# Patient Record
Sex: MALE | Race: WHITE | NOT HISPANIC OR LATINO | Employment: FULL TIME | URBAN - METROPOLITAN AREA
[De-identification: names, ages, dates, MRNs, and addresses within clinical notes are randomized per-mention and may not be internally consistent; named-entity substitution may affect disease eponyms.]

---

## 2017-08-17 ENCOUNTER — OFFICE VISIT (OUTPATIENT)
Dept: SURGERY | Facility: CLINIC | Age: 52
End: 2017-08-17
Payer: COMMERCIAL

## 2017-08-17 VITALS
BODY MASS INDEX: 24.38 KG/M2 | SYSTOLIC BLOOD PRESSURE: 100 MMHG | HEART RATE: 77 BPM | WEIGHT: 180 LBS | HEIGHT: 72 IN | DIASTOLIC BLOOD PRESSURE: 62 MMHG

## 2017-08-17 DIAGNOSIS — K42.9 UMBILICAL HERNIA WITHOUT OBSTRUCTION AND WITHOUT GANGRENE: ICD-10-CM

## 2017-08-17 DIAGNOSIS — K40.90 LEFT INGUINAL HERNIA: Primary | ICD-10-CM

## 2017-08-17 PROCEDURE — 99244 OFF/OP CNSLTJ NEW/EST MOD 40: CPT | Performed by: SURGERY

## 2017-08-17 RX ORDER — DIVALPROEX SODIUM 250 MG/1
250 TABLET, DELAYED RELEASE ORAL 2 TIMES DAILY
COMMUNITY
End: 2022-06-09

## 2017-08-17 NOTE — MR AVS SNAPSHOT
"              After Visit Summary   2017    Scott Ocampo    MRN: [id number]           Patient Information     Date Of Birth          1965        Visit Information        Provider Department      2017 9:15 AM Blake Whitley MD [facility]       Follow-ups after your visit        Who to contact     If you have questions or need follow up information about today's clinic visit or your schedule please contact [facility] directly at [phone].  Normal or non-critical lab and imaging results will be communicated to you by [employer], letter or phone within 4 business days after the clinic has received the results. If you do not hear from us within 7 days, please contact the clinic through [employer] or phone. If you have a critical or abnormal lab result, we will notify you by phone as soon as possible.  Submit refill requests through [employer] or call your pharmacy and they will forward the refill request to us. Please allow 3 business days for your refill to be completed.          Additional Information About Your Visit        MyChart Information     [employer] lets you send messages to your doctor, view your test results, renew your prescriptions, schedule appointments and more. To sign up, go to [email or web address] . Click on \"Log in\" on the left side of the screen, which will take you to the Welcome page. Then click on \"Sign up Now\" on the right side of the page.     You will be asked to enter the access code listed below, as well as some personal information. Please follow the directions to create your username and password.     Your access code is: [email or web address]  Expires: 11/15/2017  9:57 AM     Your access code will  in 90 days. If you need help or a new code, please call your [facility] clinic or [phone].        Care EveryWhere ID     This is your Care EveryWhere ID. This could be used by other " organizations to access your [place] medical records  [id number]        Your Vitals Were     Pulse Height BMI (Body Mass Index)             77 6' (1.829 m) 24.41 kg/m2          Blood Pressure from Last 3 Encounters:   08/17/17 100/62    Weight from Last 3 Encounters:   08/17/17 180 lb (81.6 kg)              Today, you had the following     No orders found for display       Primary Care Provider    Physician No Ref-Primary       No address on file        Equal Access to Services     POLLO VELÁZQUEZ : Hadii aad ku hadasho Soomaali, waaxda luqadaha, qaybta kaalmada adeegyada, waxay idiin hayolen adeeg shanika liset . So [facility] [phone].    ATENCIÓN: Si habla español, tiene a turner disposición servicios gratuitos de asistencia lingüística. Llame al [phone].    We comply with applicable federal civil rights laws and [place] laws. We do not discriminate on the basis of race, color, national origin, age, disability sex, sexual orientation or gender identity.            Thank you!     Thank you for choosing [facility]  for your care. Our goal is always to provide you with excellent care. Hearing back from our patients is one way we can continue to improve our services. Please take a few minutes to complete the written survey that you may receive in the mail after your visit with us. Thank you!             Your Updated Medication List - Protect others around you: Learn how to safely use, store and throw away your medicines at www.disposemymeds.org.          This list is accurate as of: 8/17/17  9:57 AM.  Always use your most recent med list.                   Brand Name Dispense Instructions for use Diagnosis    divalproex 250 MG EC tablet    DEPAKOTE     Take 250 mg by mouth 2 times daily        KEPPRA PO      Take 1,500 mg by mouth 2 times daily

## 2017-08-17 NOTE — LETTER
2017    [facility]  Surgery Consultation    RE:  Scott Meier Terrence-:  [birth year]     CONSULTATION REQUESTED BY:  Juventino Mccormick MD     HPI: Patient is a pleasant 52-year-old gentleman who presents for evaluation of left-sided groin pain.  He is referred by the above-mentioned provider to evaluate for possible sports hernia.  He reports that his difficulty began over a year ago when he sustained a groin injury while sprinting.  Ever since then he's had lower abdominal discomfort along his pubic bone with running in particular.  He has taken some time off withget resolution in symptoms.  He has been performing physical therapy with no smoking improvement.  He notices discomfort with situps or crunches.  He has no radiating discomfort into his testicle.  He has a prior history of bilateral hip surgery.     PMH:   has no past medical history on file.  PSH:    has a past surgical history that includes brain surgery (2016).  Social History:   reports that he has never smoked. He has never used smokeless tobacco. He reports that he drinks alcohol. He reports that he does not use illicit drugs.  Family History:  family history includes Anesthesia Reaction in his brother; CEREBROVASCULAR DISEASE in his paternal grandmother; Coronary Artery Disease in his father; Hyperlipidemia in his father; Hypertension in his mother; Other Cancer in his father; Prostate Cancer in his father; Substance Abuse in his maternal grandmother.  Medications/Allergies: Home medications and allergies reviewed.     ROS:  The 10 point Review of Systems is negative other than noted in the HPI.     Physical Exam:  /62  Pulse 77  Ht 6' (1.829 m)  Wt 180 lb (81.6 kg)  BMI 24.41 kg/m2  GENERAL: Generally appears well.  Psych: Alert and Oriented.  Normal affect  Eyes: Sclera clear  Respiratory:  Lungs clear to ausculation bilaterally with good air excursion  Cardiovascular:  Regular Rate and Rhythm with no  murmurs gallops or rubs, normal peripheral pulses  GI: Abdomen Non Distended Non-Tender  Umbilical hernia palpated..  Groin- I examined the patient in both the standing and supine positions. Right Groin- No hernia Palpated. Left Groin- Small inguinal hernia. No scrotal or testicle abnormalities.  Lymphatic/Hematologic/Immune:  No femoral or cervical lymphadenopathy.  Integumentary:  No rashes  Neurological: grossly intact      All new lab and imaging data was reviewed.      Impression and Plan:  Patient is a 52 year old male with symptomatic left inguinal hernia and umbilical hernia     PLAN: Outpatient laparoscopic inguinal hernia repair with open umbilical hernia repair at his convenience.  I discussed the pathophysiology of hernias and options for repair including laparoscopic VS open.  The risks associated with the procedure including, but not limited to, recurrence, nerve entrapment or injury, persistence of pain, injury to the bowel/bladder, infertility, hematoma, mesh migration, mesh infection, MI, and PE were discussed with the patient. He indicated understanding of the discussion, asked appropriate questions, and provided consent. Signs and symptoms of incarceration were discussed. If these develop in the interim, he promises to call or go straight to the ER. I have provided the patient with an information pamphlet.  Thank you very much for this consult.     Blake Whitley M.D.  [facility]  [phone]

## 2017-08-20 NOTE — PROGRESS NOTES
[facility]  Surgery Consultation    CONSULTATION REQUESTED BY:  Juventino Mccormick MD    HPI: Patient is a pleasant 52-year-old gentleman who presents for evaluation of left-sided groin pain.  He is referred by the above-mentioned provider to evaluate for possible sports hernia.  He reports that his difficulty began over a year ago when he sustained a groin injury while sprinting.  Ever since then he's had lower abdominal discomfort along his pubic bone with running in particular.  He has taken some time off withget resolution in symptoms.  He has been performing physical therapy with no smoking improvement.  He notices discomfort with situps or crunches.  He has no radiating discomfort into his testicle.  He has a prior history of bilateral hip surgery.    PMH:   has no past medical history on file.  PSH:    has a past surgical history that includes brain surgery (07/2016).  Social History:   reports that he has never smoked. He has never used smokeless tobacco. He reports that he drinks alcohol. He reports that he does not use illicit drugs.  Family History:  family history includes Anesthesia Reaction in his brother; CEREBROVASCULAR DISEASE in his paternal grandmother; Coronary Artery Disease in his father; Hyperlipidemia in his father; Hypertension in his mother; Other Cancer in his father; Prostate Cancer in his father; Substance Abuse in his maternal grandmother.  Medications/Allergies: Home medications and allergies reviewed.    ROS:  The 10 point Review of Systems is negative other than noted in the HPI.    Physical Exam:  /62  Pulse 77  Ht 6' (1.829 m)  Wt 180 lb (81.6 kg)  BMI 24.41 kg/m2  GENERAL: Generally appears well.  Psych: Alert and Oriented.  Normal affect  Eyes: Sclera clear  Respiratory:  Lungs clear to ausculation bilaterally with good air excursion  Cardiovascular:  Regular Rate and Rhythm with no murmurs gallops or rubs, normal peripheral pulses  GI: Abdomen Non Distended  Non-Tender  Umbilical hernia palpated..  Groin- I examined the patient in both the standing and supine positions. Right Groin- No hernia Palpated. Left Groin- Small inguinal hernia. No scrotal or testicle abnormalities.  Lymphatic/Hematologic/Immune:  No femoral or cervical lymphadenopathy.  Integumentary:  No rashes  Neurological: grossly intact     All new lab and imaging data was reviewed.     Impression and Plan:  Patient is a 52 year old male with symptomatic left inguinal hernia and umbilical hernia    PLAN: Outpatient laparoscopic inguinal hernia repair with open umbilical hernia repair at his convenience.  I discussed the pathophysiology of hernias and options for repair including laparoscopic VS open.  The risks associated with the procedure including, but not limited to, recurrence, nerve entrapment or injury, persistence of pain, injury to the bowel/bladder, infertility, hematoma, mesh migration, mesh infection, MI, and PE were discussed with the patient. He indicated understanding of the discussion, asked appropriate questions, and provided consent. Signs and symptoms of incarceration were discussed. If these develop in the interim, he promises to call or go straight to the ER. I have provided the patient with an information pamphlet.  Thank you very much for this consult.    Blake Whitley M.D.  [facility]  [phone]    Please route or send letter to:  Primary Care Provider (PCP) and Referring Provider

## 2018-04-30 ENCOUNTER — TELEPHONE (OUTPATIENT)
Dept: SURGERY | Facility: CLINIC | Age: 53
End: 2018-04-30

## 2018-04-30 NOTE — TELEPHONE ENCOUNTER
Type of surgery: Laparoscopic left inguinal hernia, possibly bilateral repair and open umbilical hernia repair  Location of surgery: [facility]  Date and time of surgery: 5/15/18 at 10:30am  Surgeon: Dr. Blake Whitley  Pre-Op Appt Date: Patient to schedule    Post-Op Appt Date: Patient to schedule   Packet sent out: Yes  Pre-cert/Authorization completed:  Not Applicable  Date: 4/30/18

## 2018-05-08 ENCOUNTER — TRANSFERRED RECORDS (OUTPATIENT)
Dept: HEALTH INFORMATION MANAGEMENT | Facility: CLINIC | Age: 53
End: 2018-05-08

## 2018-05-08 LAB
CREAT SERPL-MCNC: 0.95 MG/DL (ref 0.76–1.27)
GFR SERPL CREATININE-BSD FRML MDRD: 91 ML/MIN/1.73M2
GLUCOSE SERPL-MCNC: 88 MG/DL (ref 65–99)
INR PPP: 1 (ref 0.8–1.2)
POTASSIUM SERPL-SCNC: 4.8 MMOL/L (ref 3.5–5.2)

## 2018-05-14 RX ORDER — LORATADINE 10 MG/1
10 TABLET ORAL DAILY
COMMUNITY
End: 2022-06-09

## 2018-05-14 RX ORDER — MULTIPLE VITAMINS W/ MINERALS TAB 9MG-400MCG
1 TAB ORAL
COMMUNITY
End: 2022-07-22

## 2018-05-15 ENCOUNTER — SURGERY (OUTPATIENT)
Age: 53
End: 2018-05-15

## 2018-05-15 ENCOUNTER — ANESTHESIA (OUTPATIENT)
Dept: SURGERY | Facility: CLINIC | Age: 53
End: 2018-05-15
Payer: COMMERCIAL

## 2018-05-15 ENCOUNTER — OFFICE VISIT (OUTPATIENT)
Dept: SURGERY | Facility: PHYSICIAN GROUP | Age: 53
End: 2018-05-15
Payer: COMMERCIAL

## 2018-05-15 ENCOUNTER — HOSPITAL ENCOUNTER (OUTPATIENT)
Facility: CLINIC | Age: 53
Discharge: HOME | End: 2018-05-15
Attending: SURGERY | Admitting: SURGERY
Payer: COMMERCIAL

## 2018-05-15 ENCOUNTER — ANESTHESIA EVENT (OUTPATIENT)
Dept: SURGERY | Facility: CLINIC | Age: 53
End: 2018-05-15
Payer: COMMERCIAL

## 2018-05-15 VITALS
TEMPERATURE: 97.7 F | DIASTOLIC BLOOD PRESSURE: 71 MMHG | RESPIRATION RATE: 16 BRPM | HEIGHT: 72 IN | BODY MASS INDEX: 24.46 KG/M2 | SYSTOLIC BLOOD PRESSURE: 112 MMHG | WEIGHT: 180.6 LBS | OXYGEN SATURATION: 95 %

## 2018-05-15 DIAGNOSIS — K42.9 UMBILICAL HERNIA WITHOUT OBSTRUCTION AND WITHOUT GANGRENE: ICD-10-CM

## 2018-05-15 DIAGNOSIS — K40.20 NON-RECURRENT BILATERAL INGUINAL HERNIA WITHOUT OBSTRUCTION OR GANGRENE: Primary | ICD-10-CM

## 2018-05-15 PROCEDURE — 25000128 H RX IP 250 OP 636: Performed by: NURSE ANESTHETIST, CERTIFIED REGISTERED

## 2018-05-15 PROCEDURE — 27210794 ZZH OR GENERAL SUPPLY STERILE: Performed by: SURGERY

## 2018-05-15 PROCEDURE — 71000013 ZZH RECOVERY PHASE 1 LEVEL 1 EA ADDTL HR: Performed by: SURGERY

## 2018-05-15 PROCEDURE — 71000027 ZZH RECOVERY PHASE 2 EACH 15 MINS: Performed by: SURGERY

## 2018-05-15 PROCEDURE — 25000132 ZZH RX MED GY IP 250 OP 250 PS 637: Performed by: SURGERY

## 2018-05-15 PROCEDURE — 40000170 ZZH STATISTIC PRE-PROCEDURE ASSESSMENT II: Performed by: SURGERY

## 2018-05-15 PROCEDURE — 36000056 ZZH SURGERY LEVEL 3 1ST 30 MIN: Performed by: SURGERY

## 2018-05-15 PROCEDURE — 27210995 ZZH RX 272: Performed by: SURGERY

## 2018-05-15 PROCEDURE — C1781 MESH (IMPLANTABLE): HCPCS | Performed by: SURGERY

## 2018-05-15 PROCEDURE — 49585 ZZHC REPAIR UMBILICAL HERN,5+Y/O,REDUC: CPT | Mod: 51 | Performed by: SURGERY

## 2018-05-15 PROCEDURE — 37000009 ZZH ANESTHESIA TECHNICAL FEE, EACH ADDTL 15 MIN: Performed by: SURGERY

## 2018-05-15 PROCEDURE — 25000128 H RX IP 250 OP 636: Performed by: SURGERY

## 2018-05-15 PROCEDURE — 25000125 ZZHC RX 250: Performed by: NURSE ANESTHETIST, CERTIFIED REGISTERED

## 2018-05-15 PROCEDURE — 71000012 ZZH RECOVERY PHASE 1 LEVEL 1 FIRST HR: Performed by: SURGERY

## 2018-05-15 PROCEDURE — 25000566 ZZH SEVOFLURANE, EA 15 MIN: Performed by: SURGERY

## 2018-05-15 PROCEDURE — 36000058 ZZH SURGERY LEVEL 3 EA 15 ADDTL MIN: Performed by: SURGERY

## 2018-05-15 PROCEDURE — 49650 LAP ING HERNIA REPAIR INIT: CPT | Mod: 50 | Performed by: SURGERY

## 2018-05-15 PROCEDURE — 25000125 ZZHC RX 250: Performed by: SURGERY

## 2018-05-15 PROCEDURE — 37000008 ZZH ANESTHESIA TECHNICAL FEE, 1ST 30 MIN: Performed by: SURGERY

## 2018-05-15 PROCEDURE — 25000128 H RX IP 250 OP 636: Performed by: ANESTHESIOLOGY

## 2018-05-15 DEVICE — MESH PROGRIP LAPAROSCOPIC 5.9X3.9" PARIETEX SELF-FIX LPG1510: Type: IMPLANTABLE DEVICE | Site: GROIN | Status: FUNCTIONAL

## 2018-05-15 DEVICE — MESH COMPOSITE W/COLLAGEN 4CM OPEN VENTRAL PARIETEX PCO4VP: Type: IMPLANTABLE DEVICE | Site: UMBILICAL | Status: FUNCTIONAL

## 2018-05-15 RX ORDER — DEXAMETHASONE SODIUM PHOSPHATE 4 MG/ML
INJECTION, SOLUTION INTRA-ARTICULAR; INTRALESIONAL; INTRAMUSCULAR; INTRAVENOUS; SOFT TISSUE PRN
Status: DISCONTINUED | OUTPATIENT
Start: 2018-05-15 | End: 2018-05-15

## 2018-05-15 RX ORDER — MAGNESIUM HYDROXIDE 1200 MG/15ML
LIQUID ORAL PRN
Status: DISCONTINUED | OUTPATIENT
Start: 2018-05-15 | End: 2018-05-15 | Stop reason: HOSPADM

## 2018-05-15 RX ORDER — NALOXONE HYDROCHLORIDE 0.4 MG/ML
.1-.4 INJECTION, SOLUTION INTRAMUSCULAR; INTRAVENOUS; SUBCUTANEOUS
Status: DISCONTINUED | OUTPATIENT
Start: 2018-05-15 | End: 2018-05-15 | Stop reason: HOSPADM

## 2018-05-15 RX ORDER — ONDANSETRON 4 MG/1
4 TABLET, ORALLY DISINTEGRATING ORAL EVERY 30 MIN PRN
Status: DISCONTINUED | OUTPATIENT
Start: 2018-05-15 | End: 2018-05-15 | Stop reason: HOSPADM

## 2018-05-15 RX ORDER — FENTANYL CITRATE 50 UG/ML
25-50 INJECTION, SOLUTION INTRAMUSCULAR; INTRAVENOUS
Status: DISCONTINUED | OUTPATIENT
Start: 2018-05-15 | End: 2018-05-15 | Stop reason: HOSPADM

## 2018-05-15 RX ORDER — MEPERIDINE HYDROCHLORIDE 25 MG/ML
12.5 INJECTION INTRAMUSCULAR; INTRAVENOUS; SUBCUTANEOUS
Status: DISCONTINUED | OUTPATIENT
Start: 2018-05-15 | End: 2018-05-15 | Stop reason: HOSPADM

## 2018-05-15 RX ORDER — GLYCOPYRROLATE 0.2 MG/ML
INJECTION, SOLUTION INTRAMUSCULAR; INTRAVENOUS PRN
Status: DISCONTINUED | OUTPATIENT
Start: 2018-05-15 | End: 2018-05-15

## 2018-05-15 RX ORDER — OXYCODONE HYDROCHLORIDE 5 MG/1
5 TABLET ORAL
Status: COMPLETED | OUTPATIENT
Start: 2018-05-15 | End: 2018-05-15

## 2018-05-15 RX ORDER — PROPOFOL 10 MG/ML
INJECTION, EMULSION INTRAVENOUS CONTINUOUS PRN
Status: DISCONTINUED | OUTPATIENT
Start: 2018-05-15 | End: 2018-05-15

## 2018-05-15 RX ORDER — EPHEDRINE SULFATE 50 MG/ML
INJECTION, SOLUTION INTRAMUSCULAR; INTRAVENOUS; SUBCUTANEOUS PRN
Status: DISCONTINUED | OUTPATIENT
Start: 2018-05-15 | End: 2018-05-15

## 2018-05-15 RX ORDER — PHYSOSTIGMINE SALICYLATE 1 MG/ML
1.2 INJECTION INTRAVENOUS
Status: DISCONTINUED | OUTPATIENT
Start: 2018-05-15 | End: 2018-05-15 | Stop reason: HOSPADM

## 2018-05-15 RX ORDER — ALBUTEROL SULFATE 90 UG/1
2 AEROSOL, METERED RESPIRATORY (INHALATION) EVERY 6 HOURS
COMMUNITY
End: 2022-06-09

## 2018-05-15 RX ORDER — BUPIVACAINE HYDROCHLORIDE AND EPINEPHRINE 5; 5 MG/ML; UG/ML
INJECTION, SOLUTION EPIDURAL; INTRACAUDAL; PERINEURAL PRN
Status: DISCONTINUED | OUTPATIENT
Start: 2018-05-15 | End: 2018-05-15 | Stop reason: HOSPADM

## 2018-05-15 RX ORDER — HYDROMORPHONE HYDROCHLORIDE 1 MG/ML
.3-.5 INJECTION, SOLUTION INTRAMUSCULAR; INTRAVENOUS; SUBCUTANEOUS EVERY 10 MIN PRN
Status: DISCONTINUED | OUTPATIENT
Start: 2018-05-15 | End: 2018-05-15 | Stop reason: HOSPADM

## 2018-05-15 RX ORDER — FENTANYL CITRATE 50 UG/ML
INJECTION, SOLUTION INTRAMUSCULAR; INTRAVENOUS PRN
Status: DISCONTINUED | OUTPATIENT
Start: 2018-05-15 | End: 2018-05-15

## 2018-05-15 RX ORDER — SODIUM CHLORIDE, SODIUM LACTATE, POTASSIUM CHLORIDE, CALCIUM CHLORIDE 600; 310; 30; 20 MG/100ML; MG/100ML; MG/100ML; MG/100ML
INJECTION, SOLUTION INTRAVENOUS CONTINUOUS PRN
Status: DISCONTINUED | OUTPATIENT
Start: 2018-05-15 | End: 2018-05-15

## 2018-05-15 RX ORDER — ACETAMINOPHEN 325 MG/1
650 TABLET ORAL
Status: DISCONTINUED | OUTPATIENT
Start: 2018-05-15 | End: 2018-05-15 | Stop reason: HOSPADM

## 2018-05-15 RX ORDER — SODIUM CHLORIDE, SODIUM LACTATE, POTASSIUM CHLORIDE, CALCIUM CHLORIDE 600; 310; 30; 20 MG/100ML; MG/100ML; MG/100ML; MG/100ML
INJECTION, SOLUTION INTRAVENOUS CONTINUOUS
Status: DISCONTINUED | OUTPATIENT
Start: 2018-05-15 | End: 2018-05-15 | Stop reason: HOSPADM

## 2018-05-15 RX ORDER — EPINEPHRINE 1 MG/ML
INJECTION, SOLUTION INTRAMUSCULAR; SUBCUTANEOUS
Status: DISCONTINUED
Start: 2018-05-15 | End: 2018-05-15 | Stop reason: HOSPADM

## 2018-05-15 RX ORDER — PROPOFOL 10 MG/ML
INJECTION, EMULSION INTRAVENOUS PRN
Status: DISCONTINUED | OUTPATIENT
Start: 2018-05-15 | End: 2018-05-15

## 2018-05-15 RX ORDER — ONDANSETRON 2 MG/ML
INJECTION INTRAMUSCULAR; INTRAVENOUS PRN
Status: DISCONTINUED | OUTPATIENT
Start: 2018-05-15 | End: 2018-05-15

## 2018-05-15 RX ORDER — LIDOCAINE HYDROCHLORIDE 20 MG/ML
INJECTION, SOLUTION INFILTRATION; PERINEURAL PRN
Status: DISCONTINUED | OUTPATIENT
Start: 2018-05-15 | End: 2018-05-15

## 2018-05-15 RX ORDER — CEFAZOLIN SODIUM 1 G/3ML
1 INJECTION, POWDER, FOR SOLUTION INTRAMUSCULAR; INTRAVENOUS SEE ADMIN INSTRUCTIONS
Status: DISCONTINUED | OUTPATIENT
Start: 2018-05-15 | End: 2018-05-15 | Stop reason: HOSPADM

## 2018-05-15 RX ORDER — KETOROLAC TROMETHAMINE 30 MG/ML
30 INJECTION, SOLUTION INTRAMUSCULAR; INTRAVENOUS ONCE
Status: COMPLETED | OUTPATIENT
Start: 2018-05-15 | End: 2018-05-15

## 2018-05-15 RX ORDER — BUPIVACAINE HYDROCHLORIDE 5 MG/ML
INJECTION, SOLUTION EPIDURAL; INTRACAUDAL
Status: DISCONTINUED
Start: 2018-05-15 | End: 2018-05-15 | Stop reason: HOSPADM

## 2018-05-15 RX ORDER — NEOSTIGMINE METHYLSULFATE 1 MG/ML
VIAL (ML) INJECTION PRN
Status: DISCONTINUED | OUTPATIENT
Start: 2018-05-15 | End: 2018-05-15

## 2018-05-15 RX ORDER — ONDANSETRON 2 MG/ML
4 INJECTION INTRAMUSCULAR; INTRAVENOUS EVERY 30 MIN PRN
Status: DISCONTINUED | OUTPATIENT
Start: 2018-05-15 | End: 2018-05-15 | Stop reason: HOSPADM

## 2018-05-15 RX ORDER — CEFAZOLIN SODIUM 2 G/100ML
2 INJECTION, SOLUTION INTRAVENOUS
Status: COMPLETED | OUTPATIENT
Start: 2018-05-15 | End: 2018-05-15

## 2018-05-15 RX ORDER — OXYCODONE HYDROCHLORIDE 5 MG/1
5-10 TABLET ORAL
Qty: 30 TABLET | Refills: 0 | Status: SHIPPED | OUTPATIENT
Start: 2018-05-15 | End: 2022-06-09

## 2018-05-15 RX ORDER — FENTANYL CITRATE 50 UG/ML
25-50 INJECTION, SOLUTION INTRAMUSCULAR; INTRAVENOUS EVERY 5 MIN PRN
Status: DISCONTINUED | OUTPATIENT
Start: 2018-05-15 | End: 2018-05-15 | Stop reason: HOSPADM

## 2018-05-15 RX ADMIN — ROCURONIUM BROMIDE 10 MG: 10 INJECTION INTRAVENOUS at 10:54

## 2018-05-15 RX ADMIN — FENTANYL CITRATE 50 MCG: 50 INJECTION, SOLUTION INTRAMUSCULAR; INTRAVENOUS at 10:54

## 2018-05-15 RX ADMIN — CEFAZOLIN SODIUM 2 G: 2 INJECTION, SOLUTION INTRAVENOUS at 10:44

## 2018-05-15 RX ADMIN — PROPOFOL 200 MCG/KG/MIN: 10 INJECTION, EMULSION INTRAVENOUS at 10:37

## 2018-05-15 RX ADMIN — Medication 5 MG: at 10:45

## 2018-05-15 RX ADMIN — NEOSTIGMINE METHYLSULFATE 3 MG: 1 INJECTION, SOLUTION INTRAVENOUS at 11:40

## 2018-05-15 RX ADMIN — ROCURONIUM BROMIDE 30 MG: 10 INJECTION INTRAVENOUS at 10:37

## 2018-05-15 RX ADMIN — ROCURONIUM BROMIDE 10 MG: 10 INJECTION INTRAVENOUS at 10:48

## 2018-05-15 RX ADMIN — FENTANYL CITRATE 50 MCG: 50 INJECTION, SOLUTION INTRAMUSCULAR; INTRAVENOUS at 11:11

## 2018-05-15 RX ADMIN — FENTANYL CITRATE 50 MCG: 50 INJECTION, SOLUTION INTRAMUSCULAR; INTRAVENOUS at 10:37

## 2018-05-15 RX ADMIN — GLYCOPYRROLATE 0.4 MG: 0.2 INJECTION, SOLUTION INTRAMUSCULAR; INTRAVENOUS at 11:40

## 2018-05-15 RX ADMIN — DEXAMETHASONE SODIUM PHOSPHATE 4 MG: 4 INJECTION, SOLUTION INTRA-ARTICULAR; INTRALESIONAL; INTRAMUSCULAR; INTRAVENOUS; SOFT TISSUE at 10:44

## 2018-05-15 RX ADMIN — SODIUM CHLORIDE, POTASSIUM CHLORIDE, SODIUM LACTATE AND CALCIUM CHLORIDE: 600; 310; 30; 20 INJECTION, SOLUTION INTRAVENOUS at 10:35

## 2018-05-15 RX ADMIN — OXYCODONE HYDROCHLORIDE 5 MG: 5 TABLET ORAL at 12:52

## 2018-05-15 RX ADMIN — ONDANSETRON 4 MG: 2 INJECTION INTRAMUSCULAR; INTRAVENOUS at 10:45

## 2018-05-15 RX ADMIN — FENTANYL CITRATE 25 MCG: 50 INJECTION, SOLUTION INTRAMUSCULAR; INTRAVENOUS at 13:18

## 2018-05-15 RX ADMIN — PROPOFOL 200 MG: 10 INJECTION, EMULSION INTRAVENOUS at 10:37

## 2018-05-15 RX ADMIN — LIDOCAINE HYDROCHLORIDE 80 MG: 20 INJECTION, SOLUTION INFILTRATION; PERINEURAL at 10:37

## 2018-05-15 RX ADMIN — KETOROLAC TROMETHAMINE 30 MG: 30 INJECTION, SOLUTION INTRAMUSCULAR at 13:01

## 2018-05-15 RX ADMIN — SODIUM CHLORIDE 1000 ML: 900 IRRIGANT IRRIGATION at 11:02

## 2018-05-15 RX ADMIN — Medication 5 MG: at 10:52

## 2018-05-15 RX ADMIN — FENTANYL CITRATE 50 MCG: 50 INJECTION, SOLUTION INTRAMUSCULAR; INTRAVENOUS at 11:54

## 2018-05-15 RX ADMIN — BUPIVACAINE HYDROCHLORIDE AND EPINEPHRINE 20 ML: 5; 5 INJECTION, SOLUTION EPIDURAL; INTRACAUDAL; PERINEURAL at 11:34

## 2018-05-15 RX ADMIN — MIDAZOLAM 2 MG: 1 INJECTION INTRAMUSCULAR; INTRAVENOUS at 10:37

## 2018-05-15 ASSESSMENT — ENCOUNTER SYMPTOMS: SEIZURES: 1

## 2018-05-15 NOTE — ANESTHESIA CARE TRANSFER NOTE
Patient: Scott Ocampo    Procedure(s):  LAPAROSCOPIC  BILATERAL  INGUINAL HERNIA REPAIR WITH MESH, OPEN UMBILICAL HERNIA REPAIR WITH MESH - Wound Class: I-Clean   - Wound Class: I-Clean    Diagnosis: UMBILICAL HERNIA, LEFT INGUINAL HERNIA  Diagnosis Additional Information: No value filed.    Anesthesia Type:   General, ETT     Note:  Airway :Face Mask  Patient transferred to:PACU  Handoff Report: Identifed the Patient, Identified the Reponsible Provider, Reviewed the pertinent medical history, Discussed the surgical course, Reviewed Intra-OP anesthesia mangement and issues during anesthesia, Set expectations for post-procedure period and Allowed opportunity for questions and acknowledgement of understanding      Vitals: (Last set prior to Anesthesia Care Transfer)    CRNA VITALS  5/15/2018 1128 - 5/15/2018 1204      5/15/2018             Pulse: 55    SpO2: 99 %    Resp Rate (set): 10                Electronically Signed By: JF Rees CRNA  May 15, 2018  12:04 PM

## 2018-05-15 NOTE — BRIEF OP NOTE
[facility] Brief Operative Note    Pre-operative diagnosis: UMBILICAL HERNIA, LEFT INGUINAL HERNIA   Post-operative diagnosis Umbilical hernia, bilateral inguinal hernia   Procedure: Procedure(s):  LAPAROSCOPIC  BILATERAL  INGUINAL HERNIA REPAIR WITH MESH, OPEN UMBILICAL HERNIA REPAIR WITH MESH - Wound Class: I-Clean   - Wound Class: I-Clean   Surgeon(s): Surgeon(s) and Role:     * Blake Whitley MD - Primary     * Paty Traylor MD - Assisting   Estimated blood loss: 5 mL    Specimens: * No specimens in log *   Findings: Laparoscopic left direct hernia and right indirect hernia with placement of mesh. Umbilical hernia with placement mesh.     Paty Traylor MD  General Surgery Resident- [id number]

## 2018-05-15 NOTE — IP AVS SNAPSHOT
MRN:[id number]                      After Visit Summary   5/15/2018    Scott Ocampo    MRN: [id number]           Thank you!     Thank you for choosing [facility] for your care. Our goal is always to provide you with excellent care. Hearing back from our patients is one way we can continue to improve our services. Please take a few minutes to complete the written survey that you may receive in the mail after you visit with us. Thank you!        Patient Information     Date Of Birth          1965        About your hospital stay     You were admitted on:  May 15, 2018 You last received care in the:  [facility] Same Day Surgery    You were discharged on:  May 15, 2018       Who to Call     For medical emergencies, please call 911.  For non-urgent questions about your medical care, please call your primary care provider or clinic, None  For questions related to your surgery, please call your surgery clinic        Attending Provider     Provider Specialty    Blake Whitley MD General Surgery       Primary Care Provider Fax #    Provider Not In System [phone]      After Care Instructions     Discharge Instructions       Follow up appointment PRN. Please call with any questions or concerns. Surgical consultants clinic number: [phone]            Dressing       Keep dressing clean and dry.  Remove band aids on POD#2, allow steri strips to fall off on their own. No soaking incision for 7 days.            No lifting        No lifting over 15 lbs and no strenuous physical activity for 4 weeks            Shower       No shower for 24 hours post procedure. May shower Postoperative Day (POD)  2                  Further instructions from your care team       Today you received Toradol, an antiinflammatory medication similar to Ibuprofen.  You should not take other antiinflammatory medication, such as Ibuprofen, Motrin, Advil, Aleve, Naprosyn, etc, until 7pm.     Same Day Surgery  Discharge Instructions for  Sedation and General Anesthesia       It's not unusual to feel dizzy, light-headed or faint for up to 24 hours after surgery or while taking pain medication.  If you have these symptoms: sit for a few minutes before standing and have someone assist you when you get up to walk or use the bathroom.      You should rest and relax for the next 24 hours. We recommend you make arrangements to have an adult stay with you for at least 24 hours after your discharge.  Avoid hazardous and strenuous activity.      DO NOT DRIVE any vehicle or operate mechanical equipment for 24 hours following the end of your surgery.  Even though you may feel normal, your reactions may be affected by the medication you have received.      Do not drink alcoholic beverages for 24 hours following surgery.       Slowly progress to your regular diet as you feel able. It's not unusual to feel nauseated and/or vomit after receiving anesthesia.  If you develop these symptoms, drink clear liquids (apple juice, ginger ale, broth, 7-up, etc. ) until you feel better.  If your nausea and vomiting persists for 24 hours, please notify your surgeon.        All narcotic pain medications, along with inactivity and anesthesia, can cause constipation. Drinking plenty of liquids and increasing fiber intake will help.      For any questions of a medical nature, call your surgeon.      Do not make important decisions for 24 hours.      If you had general anesthesia, you may have a sore throat for a couple of days related to the breathing tube used during surgery.  You may use Cepacol lozenges to help with this discomfort.  If it worsens or if you develop a fever, contact your surgeon.       If you feel your pain is not well managed with the pain medications prescribed by your surgeon, please contact your surgeon's office to let them know so they can address your concerns.       [facility] - [facility]  Discharge  Instructions: Post-Operative Laparoscopic Inguinal Hernia    ACTIVITY    Take frequent, short walks and increase your activity gradually.      Avoid strenuous physical activity or heavy lifting greater than 15lbs. for 3-4 weeks.  You may climb stairs.    You may drive without restrictions when you are not using any prescription pain medication and comfortable in a car.    You may return to work/school when you are comfortable without any prescription pain medication.    WOUND CARE    You may remove your outer dressing or Band-Aids and shower 48 hours after the surgery.  Pat your incisions dry and leave open to air.  Re-apply dressing (Band-aid or gauze/tape) as needed for comfort or drainage.    You may have steri-strips (looks like white tape) on your incision.  You may peel off the steri-strip 2 weeks after surgery if they have not peeled off on their own.    Do not soak your incisions in a tub or pool for 2 weeks.     Do not apply any lotions, creams, or ointments to your incisions.    A ridge under incisions is normal and will gradually resolve.    DIET    Start with liquids, then gradually resume your regular diet as tolerated.     Drink plenty of liquids to stay hydrated.     PAIN    Expect some tenderness and discomfort at the incision sites.  Use the prescribed pain medication at your discretion.  Expect gradual resolution of your pain over several days.    You may take ibuprofen with food (unless you have been told not to) instead of or in addition to your prescribed pain medication.  If you are taking Norco or Percocet, do not take any additional acetaminophen/APAP/Tylenol.    Do not drink alcohol or drive while you are taking pain medications.    You may apply ice to your incisions in 20 minute intervals as needed for the next 48 hours.  After that time, consider switching to heat if you prefer.    EXPECTATIONS    *For our male patients, you may notice air in your scrotum and/or penis after the surgery.   This is due to the gas used during surgery.  You can expect some swelling and bruising involving the scrotum and/or penis as well as numbness.  These symptoms are expected and should gradually resolve in the next few days. You may use ice to help with the swelling and- try placing a rolled hand towel below your scrotum to help alleviate scrotal discomfort.  If you develop significant testicular or penile pain, please call our office and speak with a nurse.     Pain medications can cause constipation.  Limit use when possible.  Take over the counter stool softener/stimulant, such as Colace or Senna, 1-2 times a day with plenty of water.  You may take a mild over the counter laxative, such as Miralax or a suppository, as needed.  You may discontinue these medications once you are having regular bowel movements and/or are no longer taking your narcotic pain medication.      You may have shoulder or upper back discomfort due to the gas used in surgery.  This is temporary and should resolve in 48-72 hours.  Short, frequent walks may help with this.      FOLLOW UP    Our office will contact you approximately 2 weeks to check on your progress and answer any questions you may have.  If you are doing well, you will not need to return for a follow up appointment.  If any concerns are identified over the phone, we will help you make an appointment to see a provider.    If you have not received a phone call, have any questions or concerns, or would like to be seen, please call us at [phone] and ask to speak with our nurse.  We are located at [address] [place] [address].    CALL OUR OFFICE IF YOU HAVE:     Chills or fever above 101.5 F.    Increased redness or drainage at your incisions.    Significant bleeding.    Pain not relieved by your pain medication or rest.    Increasing pain after the first 48 hours.    Any other concerns or questions.      Revised January 2018                   "        Pending Results     No orders found from 2018 to 2018.            Admission Information     Date & Time Provider Department Dept. Phone    5/15/2018 Blake Whitley MD [facility] Same Day Surgery [phone]      Your Vitals Were     Blood Pressure Temperature Respirations Height Weight Pulse Oximetry    110/69 97.7  F (36.5  C) 18 1.829 m (6') 81.9 kg (180 lb 9.6 oz) 94%    BMI (Body Mass Index)                   24.49 kg/m2           [employer] Information     [employer] lets you send messages to your doctor, view your test results, renew your prescriptions, schedule appointments and more. To sign up, go to [email or web address] . Click on \"Log in\" on the left side of the screen, which will take you to the Welcome page. Then click on \"Sign up Now\" on the right side of the page.     You will be asked to enter the access code listed below, as well as some personal information. Please follow the directions to create your username and password.     Your access code is: [email or web address]  Expires: 2018 12:46 PM     Your access code will  in 90 days. If you need help or a new code, please call your [facility] clinic or [phone].        Care EveryWhere ID     This is your Care EveryWhere ID. This could be used by other organizations to access your [facility] medical records  [id number]        Equal Access to Services     POLLO VELÁZQUEZ AH: Hadii jose e geeo Socarmine, waaxda luqadaha, qaybta kaalmada adeegyada, roseann bishop aderony hutchnis . So [facility] [phone].    ATENCIÓN: Si habla español, tiene a turner disposición servicios gratuitos de asistencia lingüística. Lllisbeth al [phone].    We comply with applicable federal civil rights laws and [place] laws. We do not discriminate on the basis of race, color, national origin, age, disability, sex, sexual orientation, or gender identity.               Review of your medicines      START taking        Dose / Directions    " oxyCODONE IR 5 MG tablet   Commonly known as:  ROXICODONE   Used for:  Non-recurrent bilateral inguinal hernia without obstruction or gangrene, Umbilical hernia without obstruction and without gangrene   Notes to Patient:  Pt had 1 tablet at 12:52pm        Dose:  5-10 mg   Take 1-2 tablets (5-10 mg) by mouth every 3 hours as needed for pain or other (Moderate to Severe)   Quantity:  30 tablet   Refills:  0         CONTINUE these medicines which have NOT CHANGED        Dose / Directions    albuterol 108 (90 Base) MCG/ACT Inhaler   Commonly known as:  PROAIR HFA/PROVENTIL HFA/VENTOLIN HFA        Dose:  2 puff   Inhale 2 puffs into the lungs every 6 hours   Refills:  0       divalproex sodium delayed-release 250 MG DR tablet   Commonly known as:  DEPAKOTE        Dose:  250 mg   Take 250 mg by mouth 2 times daily 1 tablet AM, 2 tablets PM   Refills:  0       FISH OIL PO        Dose:  2 tablet   Take 2 tablets by mouth 2 times daily   Refills:  0       fluticasone 27.5 MCG/SPRAY spray   Commonly known as:  VERAMYST        Dose:  2 spray   Spray 2 sprays into both nostrils daily as needed for rhinitis or allergies   Refills:  0       KEPPRA PO        Dose:  1500 mg   Take 1,500 mg by mouth 2 times daily   Refills:  0       loratadine 10 MG tablet   Commonly known as:  CLARITIN        Dose:  10 mg   Take 10 mg by mouth daily   Refills:  0       Multi-vitamin Tabs tablet        Dose:  1 tablet   Take 1 tablet by mouth 2 times daily   Refills:  0            Where to get your medicines      Some of these will need a paper prescription and others can be bought over the counter. Ask your nurse if you have questions.     Bring a paper prescription for each of these medications     oxyCODONE IR 5 MG tablet                Protect others around you: Learn how to safely use, store and throw away your medicines at www.disposemymeds.org.        Information about OPIOIDS     PRESCRIPTION OPIOIDS: WHAT YOU NEED TO KNOW   You have a  prescription for an opioid (narcotic) pain medicine. Opioids can cause addiction. If you have a history of chemical dependency of any type, you are at a higher risk of becoming addicted to opioids. Only take this medicine after all other options have been tried. Take it for as short a time and as few doses as possible.     Do not:    Drive. If you drive while taking these medicines, you could be arrested for driving under the influence (DUI).    Operate heavy machinery    Do any other dangerous activities while taking these medicines.     Drink any alcohol while taking these medicines.      Take with any other medicines that contain acetaminophen. Read all labels carefully. Look for the word  acetaminophen  or  Tylenol.  Ask your pharmacist if you have questions or are unsure.    Store your pills in a secure place, locked if possible. We will not replace any lost or stolen medicine. If you don t finish your medicine, please throw away (dispose) as directed by your pharmacist. The [place] Pollution Control Agency has more information about safe disposal: https://www.pca.[facility].[place].us/living-green/managing-unwanted-medications    All opioids tend to cause constipation. Drink plenty of water and eat foods that have a lot of fiber, such as fruits, vegetables, prune juice, apple juice and high-fiber cereal. Take a laxative (Miralax, milk of magnesia, Colace, Senna) if you don t move your bowels at least every other day.              Medication List: This is a list of all your medications and when to take them. Check marks below indicate your daily home schedule. Keep this list as a reference.      Medications           Morning Afternoon Evening Bedtime As Needed    albuterol 108 (90 Base) MCG/ACT Inhaler   Commonly known as:  PROAIR HFA/PROVENTIL HFA/VENTOLIN HFA   Inhale 2 puffs into the lungs every 6 hours                                divalproex sodium delayed-release 250 MG DR tablet   Commonly known as:  DEPAKOTE    Take 250 mg by mouth 2 times daily 1 tablet AM, 2 tablets PM                                FISH OIL PO   Take 2 tablets by mouth 2 times daily                                fluticasone 27.5 MCG/SPRAY spray   Commonly known as:  VERAMYST   Spray 2 sprays into both nostrils daily as needed for rhinitis or allergies                                KEPPRA PO   Take 1,500 mg by mouth 2 times daily                                loratadine 10 MG tablet   Commonly known as:  CLARITIN   Take 10 mg by mouth daily                                Multi-vitamin Tabs tablet   Take 1 tablet by mouth 2 times daily                                oxyCODONE IR 5 MG tablet   Commonly known as:  ROXICODONE   Take 1-2 tablets (5-10 mg) by mouth every 3 hours as needed for pain or other (Moderate to Severe)   Last time this was given:  5 mg on 5/15/2018 12:52 PM   Notes to Patient:  Pt had 1 tablet at 12:52pm

## 2018-05-15 NOTE — OR NURSING
Mild left side weakness postop brain surgery.    Rare sensory seizures, head fogginess and left arm weakness.

## 2018-05-15 NOTE — ANESTHESIA PREPROCEDURE EVALUATION
Anesthesia Evaluation     . Pt has had prior anesthetic.     No history of anesthetic complications          ROS/MED HX    ENT/Pulmonary:     (+)asthma (childhood asthma) , . .    Neurologic:     (+)seizures features: altered sensation, left sided, other neuro S/P craniotomy for oligodendroma    Cardiovascular:         METS/Exercise Tolerance:     Hematologic:         Musculoskeletal:         GI/Hepatic:     (+) GERD Asymptomatic on medication,       Renal/Genitourinary:         Endo:         Psychiatric:         Infectious Disease:         Malignancy:   (+) Malignancy History of Neuro  Neuro CA Remission status post.          Other:                     Physical Exam  Normal systems: cardiovascular and pulmonary    Airway   Mallampati: I  TM distance: >3 FB  Neck ROM: full    Dental     Cardiovascular       Pulmonary                     Anesthesia Plan      History & Physical Review  History and physical reviewed and following examination; no interval change.    ASA Status:  2 .    NPO Status:  > 8 hours    Plan for General and ETT with Intravenous and Propofol induction. Maintenance will be TIVA.    PONV prophylaxis:  Ondansetron (or other 5HT-3) and Dexamethasone or Solumedrol       Postoperative Care  Postoperative pain management:  IV analgesics and Oral pain medications.      Consents  Anesthetic plan, risks, benefits and alternatives discussed with:  Patient..        DPreop diagnosis: UMBILICAL HERNIA, LEFT INGUINAL HERNIA  Procedure(s):  LAPAROSCOPIC HERNIORRHAPHY INGUINAL  LAPAROSCOPIC HERNIORRHAPHY INGUINAL BILATERAL  HERNIORRHAPHY UMBILICAL  No Known Allergies    No current facility-administered medications on file prior to encounter.   Current Outpatient Prescriptions on File Prior to Encounter:  divalproex (DEPAKOTE) 250 MG EC tablet Take 250 mg by mouth 2 times daily 1 tablet AM, 2 tablets PM   LevETIRAcetam (KEPPRA PO) Take 1,500 mg by mouth 2 times daily                         .

## 2018-05-15 NOTE — DISCHARGE INSTRUCTIONS
Today you received Toradol, an antiinflammatory medication similar to Ibuprofen.  You should not take other antiinflammatory medication, such as Ibuprofen, Motrin, Advil, Aleve, Naprosyn, etc, until 7pm.     Same Day Surgery Discharge Instructions for  Sedation and General Anesthesia       It's not unusual to feel dizzy, light-headed or faint for up to 24 hours after surgery or while taking pain medication.  If you have these symptoms: sit for a few minutes before standing and have someone assist you when you get up to walk or use the bathroom.      You should rest and relax for the next 24 hours. We recommend you make arrangements to have an adult stay with you for at least 24 hours after your discharge.  Avoid hazardous and strenuous activity.      DO NOT DRIVE any vehicle or operate mechanical equipment for 24 hours following the end of your surgery.  Even though you may feel normal, your reactions may be affected by the medication you have received.      Do not drink alcoholic beverages for 24 hours following surgery.       Slowly progress to your regular diet as you feel able. It's not unusual to feel nauseated and/or vomit after receiving anesthesia.  If you develop these symptoms, drink clear liquids (apple juice, ginger ale, broth, 7-up, etc. ) until you feel better.  If your nausea and vomiting persists for 24 hours, please notify your surgeon.        All narcotic pain medications, along with inactivity and anesthesia, can cause constipation. Drinking plenty of liquids and increasing fiber intake will help.      For any questions of a medical nature, call your surgeon.      Do not make important decisions for 24 hours.      If you had general anesthesia, you may have a sore throat for a couple of days related to the breathing tube used during surgery.  You may use Cepacol lozenges to help with this discomfort.  If it worsens or if you develop a fever, contact your surgeon.       If you feel your pain is not  well managed with the pain medications prescribed by your surgeon, please contact your surgeon's office to let them know so they can address your concerns.       [facility] - [facility]  Discharge Instructions: Post-Operative Laparoscopic Inguinal Hernia    ACTIVITY    Take frequent, short walks and increase your activity gradually.      Avoid strenuous physical activity or heavy lifting greater than 15lbs. for 3-4 weeks.  You may climb stairs.    You may drive without restrictions when you are not using any prescription pain medication and comfortable in a car.    You may return to work/school when you are comfortable without any prescription pain medication.    WOUND CARE    You may remove your outer dressing or Band-Aids and shower 48 hours after the surgery.  Pat your incisions dry and leave open to air.  Re-apply dressing (Band-aid or gauze/tape) as needed for comfort or drainage.    You may have steri-strips (looks like white tape) on your incision.  You may peel off the steri-strip 2 weeks after surgery if they have not peeled off on their own.    Do not soak your incisions in a tub or pool for 2 weeks.     Do not apply any lotions, creams, or ointments to your incisions.    A ridge under incisions is normal and will gradually resolve.    DIET    Start with liquids, then gradually resume your regular diet as tolerated.     Drink plenty of liquids to stay hydrated.     PAIN    Expect some tenderness and discomfort at the incision sites.  Use the prescribed pain medication at your discretion.  Expect gradual resolution of your pain over several days.    You may take ibuprofen with food (unless you have been told not to) instead of or in addition to your prescribed pain medication.  If you are taking Norco or Percocet, do not take any additional acetaminophen/APAP/Tylenol.    Do not drink alcohol or drive while you are taking pain medications.    You may apply ice to your incisions in  20 minute intervals as needed for the next 48 hours.  After that time, consider switching to heat if you prefer.    EXPECTATIONS    *For our male patients, you may notice air in your scrotum and/or penis after the surgery.  This is due to the gas used during surgery.  You can expect some swelling and bruising involving the scrotum and/or penis as well as numbness.  These symptoms are expected and should gradually resolve in the next few days. You may use ice to help with the swelling and- try placing a rolled hand towel below your scrotum to help alleviate scrotal discomfort.  If you develop significant testicular or penile pain, please call our office and speak with a nurse.     Pain medications can cause constipation.  Limit use when possible.  Take over the counter stool softener/stimulant, such as Colace or Senna, 1-2 times a day with plenty of water.  You may take a mild over the counter laxative, such as Miralax or a suppository, as needed.  You may discontinue these medications once you are having regular bowel movements and/or are no longer taking your narcotic pain medication.      You may have shoulder or upper back discomfort due to the gas used in surgery.  This is temporary and should resolve in 48-72 hours.  Short, frequent walks may help with this.      FOLLOW UP    Our office will contact you approximately 2 weeks to check on your progress and answer any questions you may have.  If you are doing well, you will not need to return for a follow up appointment.  If any concerns are identified over the phone, we will help you make an appointment to see a provider.    If you have not received a phone call, have any questions or concerns, or would like to be seen, please call us at [phone] and ask to speak with our nurse.  We are located at [address] [place] [address].    CALL OUR OFFICE IF YOU HAVE:     Chills or fever above 101.5 F.    Increased redness or drainage at your  incisions.    Significant bleeding.    Pain not relieved by your pain medication or rest.    Increasing pain after the first 48 hours.    Any other concerns or questions.      Revised January 2018

## 2018-05-15 NOTE — ANESTHESIA POSTPROCEDURE EVALUATION
Patient: Scott Ocampo    Procedure(s):  LAPAROSCOPIC  BILATERAL  INGUINAL HERNIA REPAIR WITH MESH, OPEN UMBILICAL HERNIA REPAIR WITH MESH - Wound Class: I-Clean   - Wound Class: I-Clean    Diagnosis:UMBILICAL HERNIA, LEFT INGUINAL HERNIA  Diagnosis Additional Information: No value filed.    Anesthesia Type:  General, ETT    Note:  Anesthesia Post Evaluation    Patient location during evaluation: PACU  Patient participation: Able to fully participate in evaluation  Level of consciousness: awake  Pain management: adequate  Airway patency: patent  Cardiovascular status: acceptable  Respiratory status: acceptable  Hydration status: acceptable  PONV: none     Anesthetic complications: None          Last vitals:  Vitals:    05/15/18 1315 05/15/18 1330 05/15/18 1345   BP: 108/69 104/58 110/69   Resp: 15 12 18   Temp: 36.2  C (97.2  F) 36.3  C (97.3  F) 36.5  C (97.7  F)   SpO2: 93% 93% 94%         Electronically Signed By: Chetan Eli MD  May 15, 2018  2:20 PM

## 2018-05-15 NOTE — IP AVS SNAPSHOT
[facility]    [address]    [place] [address]    Phone:  [phone]    Fax:  [phone]                                       After Visit Summary   5/15/2018    Scott Ocampo    MRN: [id number]           After Visit Summary Signature Page     I have received my discharge instructions, and my questions have been answered. I have discussed any challenges I see with this plan with the nurse or doctor.    ..........................................................................................................................................  Patient/Patient Representative Signature      ..........................................................................................................................................  Patient Representative Print Name and Relationship to Patient    ..................................................               ................................................  Date                                            Time    ..........................................................................................................................................  Reviewed by Signature/Title    ...................................................              ..............................................  Date                                                            Time

## 2018-05-16 ENCOUNTER — TELEPHONE (OUTPATIENT)
Dept: SURGERY | Facility: CLINIC | Age: 53
End: 2018-05-16

## 2018-05-16 NOTE — TELEPHONE ENCOUNTER
Spoke with PA who reports that there are not any other recommended pain medications that are given post surgery.    Left message for patient with information.    Encouraged him to call with any additional questions or concerns and also informed him that this will be discussed with Dr. Whitley tomorrow when he is in clinic in the case that he has any recommendations.    Annabel Henao RN

## 2018-05-16 NOTE — TELEPHONE ENCOUNTER
"Patient had laparoscopic bilateral inguinal hernia repair with mesh and open umbilical hernia repair with mesh on 5/15/18 with Dr. Whitley.  Calling triage today to discuss other options for pain medications.  He reports that last year he did a pharmacogenomics test and oxycodone, hydrocodone, tramadol, and codeine, were all among the listed medications that he should avoid d/t metabolism and his body type and how it'll break down in his system.  He reports that the report shows that fentanyl, midazolam, buprenorphine, alfentanyl, and dilaudid are all medications that he can safely take.    Informed him that generally we do not prescribe any of those outside of a patient being inpatient in the hospital.  Also informed him that midazolam is a sedative and is not a analgesic.  He reports that he has been taking ibuprofen.  He took one dose of the oxycodone prior to looking at the pharmacogenomics report.  He reports tylenol has never been helpful for pain control.  Advised him to utilize ice, ibuprofen, and tylenol.  Also informed him that Dr. Whitley is unavailable today, but this will be discussed with a colleague/PA and he will receive a call back.  Informed him that more than likely his request for one of these additional meds on the \"ok\" list will not be approved.    He will wait for return phone call.    Annabel Henao RN          "

## 2018-05-16 NOTE — OP NOTE
General Surgery Operative Note    PREOPERATIVE DIAGNOSIS:  UMBILICAL HERNIA, LEFT INGUINAL HERNIA    POSTOPERATIVE DIAGNOSIS: Bilateral inguinal hernia, umbilical hernia    PROCEDURE: Laparoscopic bilateral inguinal hernia repair with mesh, open umbilical hernia repair with mesh    ANESTHESIA:  General.    PREOPERATIVE MEDICATIONS: Ancef IV    SURGEON:  Blake Whitley M.D.    ASSISTANT:  Paty Traylor MD    INDICATIONS:  Mr. Ocampo has a symptomatic Left  inguinal hernia as well as an umbilical hernia. Options were discussed and it was elected to proceed with laparoscopic repair of the inguinal hernia and open repair of the umbilical hernia. Potential risks of bleeding, infection, neurovascular injury to the vas deferens or testicle, recurrent hernia, chronic pain were all reviewed in detail and he wished to proceed.     DESCRIPTION OF PROCEDURE: The patient was taken to the operating suite and uneventfully endotracheally intubated. The abdomen and groin were prepped and draped in a sterile fashion. Flynn catheter was placed using sterile technique for bladder decompression. Surgeon initiated timeout was acknowledged. Physician's  Assistant was necessary for the case to assist in prepping, positioning, camera operation, retraction/exposure, and closure of port sites. We made a curvilinear incision at the underside of the umbilicus and took this down through skin and subcutaneous tissue. We dissected down until the anterior rectus sheath was encountered. We incised along the fascia such that we were looking at the rectus muscle directly. The rectus muscle was retracted laterally and we inserted our dissecting balloon along the posterior rectus sheath toward the pubic bone. Once this was in place, we removed the obturator and inserted our camera. We then instilled air into the dissecting balloon and under direct visualization created our preperitoneal space. Dissecting balloon was then removed and our working balloon  was placed and positioned. Two 5 mm trocars were placed along the lower midline under direct laparoscopic visualization. We began our dissection on the right side. Using combination of sharp and blunt dissection, we created a plane behind the abdominal wall and the underlying peritoneum. This was done in a blunt and atraumatic fashion. The inferior epigastric vessels were visualized running along the underside of the abdominal wall.  We followed the epigastric vessels down until we were able to identify the internal ring. The spermatic cord was skeletonized.  Indirect hernia was present and reduced. We continued our dissection until we had an excellent space in the preperitoneal area. We then placed a Progrip mesh within this space.  Once we were satisfied with our position, we turned our attention toward the other side.   Using a combination of sharp and blunt dissection, we were able to dissect out the spermatic cord. We created a space between the peritoneum and the anterior abdominal wall. Once we had dissected out the spermatic cord, we were able to identify the vas and the spermatic vessels. We skeletonized these structures such that there was no intervening cord lipoma or hernia sac. Direct hernia was present and reduced. Once we were satisfied with the space that we had, we deployed another Progrip hernia mesh. Once we were satisfied with the position, the mesh was held in place and the insufflation was released. The mesh was held in excellent position under direct visualization until the insufflation was completely out of the preperitoneal space. We then removed the 5 mm working ports under direct visualization. We removed the working balloon.    Attention was then turned to repairing the umbilical hernia. The curvilinear infraumbilical incision was extended and dissection was carried into the subcutaneous tissues. The umbilical skin was then mobilized from the underlying hernia sac. The hernia sac was  dissected down to the level of the fascial margins. The margins of the fascia were thoroughly dissected and the hernia sac and its contents were reduced. Additional preperitoneal dissection was performed around the margins of the defect. PCO 4VP was then introduced. This was deployed through the defect. This laid out flat in the preperitoneal space. The tails of the mesh were cut and the fascia was closed overlying the mesh, incorporating mesh fixation to the cut tails using interrupted 0 Vicryl suture. The umbilical skin was then tacked back to the fascia using a 3-0 Vicryl stitch. Deep subcu was closed with 3-0 Vicryl stitch. Skin incisions were all closed with subcuticular 4-0 Vicryl stitch. Benzoin and Steri-Strips were applied. Needle and sponge counts were correct. The patient tolerated this well and was taken from the operating room to the recovery room in stable condition.       ESTIMATED BLOOD LOSS:  5cc      Blake Whitley MD

## 2018-06-07 ENCOUNTER — TELEPHONE (OUTPATIENT)
Dept: OTHER | Facility: CLINIC | Age: 53
End: 2018-06-07

## 2018-06-07 NOTE — TELEPHONE ENCOUNTER
[facility]  Post op call note - No Answer    Scott Ocampo was called for an update regarding his recovery.  There was no answer and a message was left requesting a return call.    Paty Traylor MD  General Surgery Resident- PGY5

## 2018-06-14 ENCOUNTER — TELEPHONE (OUTPATIENT)
Dept: SURGERY | Facility: CLINIC | Age: 53
End: 2018-06-14

## 2018-06-14 DIAGNOSIS — Z98.890 HX OF BILATERAL INGUINAL HERNIA REPAIR: ICD-10-CM

## 2018-06-14 DIAGNOSIS — R10.31 BILATERAL GROIN PAIN: Primary | ICD-10-CM

## 2018-06-14 DIAGNOSIS — R10.32 BILATERAL GROIN PAIN: Primary | ICD-10-CM

## 2018-06-14 DIAGNOSIS — Z87.19 HX OF BILATERAL INGUINAL HERNIA REPAIR: ICD-10-CM

## 2018-06-14 NOTE — TELEPHONE ENCOUNTER
"Patient had laparoscopic bilateral inguinal hernia repair with mesh, and open umbilical hernia repair with mesh on 05/15/2018 with Dr. Whitley.      Calling today reporting that he still occasionally has \"deep groin pain\" along with some pain in his left testicle.  He reports that this deep pain is similar to the original pain he experienced when it was thought he had a sports hernia vs inguinal hernia.    Advised patient to continue utilizing ice and ibuprofen as he continues to increase his physical activity levels.  Also informed him that this will be discussed with Dr. Whitley to see if referral to Physical Therapy for strengthening and flexibility would be beneficial.  Patient appreciative of this suggestion and would like to go to [facility] in [place].    Encouraged patient to call and schedule face to face visit with Dr. Whitley if discomfort and testicular pain continues to be ongoing.    He agreed.      Will discuss PT Referral with Dr. Whitley.    Annabel Henao RN  "

## 2021-09-08 ENCOUNTER — TELEPHONE (OUTPATIENT)
Dept: UROLOGY | Facility: CLINIC | Age: 56
End: 2021-09-08

## 2021-09-08 DIAGNOSIS — R86.9 SEMEN ABNORMALITY: Primary | ICD-10-CM

## 2021-09-08 NOTE — TELEPHONE ENCOUNTER
KATE Lucas MD  You 21 minutes ago (4:01 PM)   WG  Yes      Orders placed- patient called LM with information of order and to contact lab to set up appointment    Taz HERZOG RN   [facility]

## 2021-09-08 NOTE — TELEPHONE ENCOUNTER
[facility] Call Center    Phone Message    May a detailed message be left on voicemail: yes     Reason for Call: Other: Pt called in to schedule a URO intake for discolored semen(NOV is 9/27/21 for a virtual visit), and would like a semen analysis and PSA lab scheduled beforehand as well. Please call pt back to discuss.      Action Taken: Message routed to:  Other: [place] uro    Travel Screening: Not Applicable

## 2021-09-20 ENCOUNTER — LAB (OUTPATIENT)
Dept: LAB | Facility: CLINIC | Age: 56
End: 2021-09-20
Payer: COMMERCIAL

## 2021-09-20 DIAGNOSIS — R86.9 SEMEN ABNORMALITY: ICD-10-CM

## 2021-09-20 LAB — PSA SERPL-MCNC: 1.84 UG/L (ref 0–4)

## 2021-09-20 PROCEDURE — 84153 ASSAY OF PSA TOTAL: CPT

## 2021-09-20 PROCEDURE — 36415 COLL VENOUS BLD VENIPUNCTURE: CPT

## 2021-09-23 ENCOUNTER — LAB (OUTPATIENT)
Dept: LAB | Facility: CLINIC | Age: 56
End: 2021-09-23
Attending: UROLOGY
Payer: COMMERCIAL

## 2021-09-23 DIAGNOSIS — R86.9 SEMEN ABNORMALITY: ICD-10-CM

## 2021-09-23 DIAGNOSIS — R86.9 SEMEN ABNORMALITY: Primary | ICD-10-CM

## 2021-09-23 PROCEDURE — 89322 SEMEN ANAL STRICT CRITERIA: CPT

## 2021-09-24 LAB
ABNORMAL SPERM MORPHOLOGY: 96
ABSTINENCE DAYS: 3 DAYS (ref 2–7)
AGGLUTINATION: NO
ANALYSIS TEMP - CENTIGRADE: 21 CENTIGRADE
COLLECTION METHOD: NORMAL
COLLECTION SITE: NORMAL
CONSENT TO RELEASE TO PARTNER: NO
DAL- RECEIVED TIME: NORMAL
HEAD DEFECT: 96 %
IMMOTILE: 26 %
LIQUEFIED: YES
MIDPIECE DEFECT: 40 %
NON-PROGRESSIVE MOTILITY: 1 %
NORMAL SPERM MORPHOLOGY: 4 % NORMAL FORMS
PROGRESSIVE MOTILITY: 73 %
ROUND CELLS: 0.1 MILLION/ML
SPECIMEN PH: 7.2 PH
SPECIMEN VOLUME: 2.3 ML
SPERM CONCENTRATION: 74 MILLION/ML
TAIL DEFECT: 21 %
TIME OF ANALYSIS: NORMAL
TOTAL PROGRESSIVE MOTILE NUMBER: 124 MILLION
TOTAL SPERM NUMBER: 170 MILLION
VISCOUS: NO
VITALITY: NORMAL

## 2021-09-27 ENCOUNTER — VIRTUAL VISIT (OUTPATIENT)
Dept: UROLOGY | Facility: CLINIC | Age: 56
End: 2021-09-27
Payer: COMMERCIAL

## 2021-09-27 DIAGNOSIS — R36.1 HEMOSPERMIA: Primary | ICD-10-CM

## 2021-09-27 PROCEDURE — 99203 OFFICE O/P NEW LOW 30 MIN: CPT | Mod: TEL | Performed by: UROLOGY

## 2021-09-27 NOTE — PROGRESS NOTES
Telephone visit    56-year-old male recently submitted a semen sample for evaluation after noticing reddish-brown discoloration of his ejaculate.    No trauma history.    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    He also reports distal curvature of the penis consistent with Peyronie's disease.  Not currently sexually active.    Has a family history of prostate cancer.  Father's prostate was removed in his 70s but did not die of prostate cancer.    Objective:    Semen analysis was essentially normal other than the observation of slight discoloration and less than 2 red cells per 200 power field.    PSA 1.84 ng/mL on 09/20/2021    Impression: Hematospermia and penile curvature during erection.  Family history of prostate cancer.    Plan: We discussed at length the significance of hematospermia.  It is highly unlikely that the discoloration of the ejaculate is of any clinical significance.  Additionally the semen analysis was essentially normal.    His PSA is within the normal range and I simply suggested he continue checking this on a yearly basis in view of his family history of prostate cancer.    The probable Peyronie's disease does not require intervention at this time.  Should the curvature prevent intercourse he will contact us.    Total time 15 minutes

## 2022-05-03 ENCOUNTER — TELEPHONE (OUTPATIENT)
Dept: FAMILY MEDICINE | Facility: CLINIC | Age: 57
End: 2022-05-03
Payer: COMMERCIAL

## 2022-05-09 NOTE — TELEPHONE ENCOUNTER
Patient called back, writer huddled with Laine and warm transferred patient to schedule.     Niyah Rangel RN  [facility]

## 2022-06-08 PROBLEM — G40.909 SEIZURE DISORDER (H): Status: ACTIVE | Noted: 2022-06-08

## 2022-06-08 PROBLEM — Z80.42 FAMILY HISTORY OF PROSTATE CANCER: Status: ACTIVE | Noted: 2022-06-08

## 2022-06-08 PROBLEM — C71.9 GLIOMA (H): Status: ACTIVE | Noted: 2022-06-08

## 2022-06-08 NOTE — PROGRESS NOTES
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  He completed fractionated radiation with concurrent oral temozolomide in October 2016 followed by 4 cycles of oral temozolomide.  By December 2016, there was good tumor control the insula but a new area of T2 signal abnormality in the mesial left frontal lobe which subsequently increased.  This was outside of his prior radiation therapy field and felt to be recurrence/progression.  The risk of surgery was that of permanent left leg weakness and nondiagnostic sample if biopsied.  He then completed a boost of radiation therapy with concurrent temozolomide, followed by 12 cycles of adjuvant temozolomide completed in April 2018    Seizure disorder (H)  *He is on Depakote and Keppra    Chronic fatigue  We continue Cymbalta which will help with joint pains but I first want to get the labs done  - CBC with Platelets    - Comprehensive metabolic panel  - Vitamin B12  - Vitamin D Deficiency  - Testosterone, total  - Cortisol    Flatulence, eructation and gas pain  I do not think probiotics to be taken every day but that has helped him with his diarrhea  Repeat EGD and colon exam may be needed  - TSH with free T4 reflex  - Comprehensive metabolic panel    Non-celiac gluten sensitivity  As above he has family history of celiac disease  - TSH with free T4 reflex    Drug-induced erectile dysfunction  We will check the following and also use Cialis every day specially if he uses Proscar  - Testosterone, total  - Cortisol    Gastroesophageal reflux disease with esophagitis without hemorrhage  Patient is on probiotics and  has used Prilosec    Benign prostatic hyperplasia with urinary obstruction  We could use Proscar  I would not use Avodart because of patient's family history of prostate cancer  - tadalafil (CIALIS) 5 MG tablet  Dispense: 90 tablet; Refill: 11    Androgenic alopecia  We could use Proscar as above      Vestibular neuronitis, unspecified laterality  He is off balance also from antiepileptics and might need ENT consult to see if something more can be done and vestibular rehab can be offered  - Adult ENT  Referral    Varicose veins of left lower extremity with inflammation  Ablation will be needed  - Vascular Surgery Referral    Generalized hyperhidrosis  We discussed about higher strength aluminum products, botulinum toxin, ultrasound treatments  He will try the following  - Glycopyrronium Tosylate 2.4 % PADS  Dispense: 30 each; Refill: 3    Tear of right acetabular labrum, sequela  We will try Cymbalta    Screening PSA (prostate specific antigen)    - PSA, screen    Family history of coronary arteriosclerosis  Family history of ischemic heart disease is noticed  - Lipid panel reflex to direct LDL Fasting  - CT Calcium Screening    Infection due to 2019 novel coronavirus  We will give him the COVID-vaccine on the return visit when he comes for a physical exam          Return in about 3 weeks (around 6/30/2022) for Physical Exam, covid booster an hour visit.    Suyapa Sinha MD  [facility]    This is extremely complex patient who is new to me  He wishes to establish care  And there are some questions he would like to address today  I have reviewed his records from 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  He completed fractionated radiation with concurrent oral  temozolomide in October 2016 followed by 4 cycles of oral temozolomide.  By December 2016, there was good tumor control the insula but a new area of T2 signal abnormality in the mesial left frontal lobe which subsequently increased.  This was outside of his prior radiation therapy field and felt to be recurrence/progression.  The risk of surgery was that of permanent left leg weakness and nondiagnostic sample if biopsied.  He then completed a boost of radiation therapy with concurrent temozolomide, followed by 12 cycles of adjuvant temozolomide completed in April 2018.  He entered into every6 month surveillance in [place], and subsequently transitioned care to [facility] in January 2019.  His MRIs done every 6 months    Laterality Date     CRANIOTOMY 7/5/16        patient has seizure disorder and last seizure was a month ago  He is on Lamictal and Keppra  He follows up with neurology at [facility]  He remains in remission in regards to his glioma  But he has chronic fatigue  He has chronic GI issues with bloating and he takes a probiotic and follows a gluten-free diet  He has GERD  He has some urinary frequency  In addition he has some sexual dysfunction or performance anxiety  He has hyperhidrosis  He has tried over-the-counter meds and had ablation done in the past  He has never tried Botox injections    In addition he has arthritis everywhere including bilateral hip labral tear  He has back issues    Recently 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 : Intake visit - new patient seeking GP  How many servings of fruits and vegetables do you eat daily?: 2-3  On average, how many sweetened beverages do you drink each day (Examples: soda, juice, sweet tea, etc.  Do NOT count diet or artificially sweetened beverages)?: 1  How many minutes a day do you exercise enough to make your heart beat faster?: 30 to 60  How many days per week do you miss taking your medication?: 1      Past Medical History:   Diagnosis Date     DDD (degenerative disc disease), cervical      Family history of prostate cancer      FH: chemotherapy      Gastroesophageal reflux disease      Glioma (H)      S/P radiation therapy      Seasonal allergies      Seizures (H)      Tear of right acetabular labrum      Uncomplicated asthma     exercise induced     Varicose vein of leg      Past Surgical History:   Procedure Laterality Date     BRAIN SURGERY  07/2016     HEAD & NECK SURGERY      partial brain tumor resection     HERNIORRHAPHY UMBILICAL N/A 5/15/2018    Procedure: HERNIORRHAPHY UMBILICAL;;  Surgeon: Blake Whitley MD;  Location: [facility]     LAPAROSCOPIC HERNIORRHAPHY INGUINAL BILATERAL Bilateral 5/15/2018    Procedure: LAPAROSCOPIC HERNIORRHAPHY INGUINAL BILATERAL;  LAPAROSCOPIC  BILATERAL  INGUINAL HERNIA REPAIR WITH MESH, OPEN UMBILICAL HERNIA REPAIR WITH MESH;  Surgeon: Blake Whitley MD;  Location: [facility]     ORTHOPEDIC SURGERY      left labral tear repair     Family History   Problem Relation Age of Onset     Hypertension Mother      Coronary Artery Disease Father 50     Hyperlipidemia Father      Prostate Cancer Father 70     Other Cancer Father      Lung Cancer Father 80        non smoker     Celiac Disease Sister      Osteogenesis imperfecta Brother      Substance Abuse Maternal Grandmother      Cerebrovascular Disease Paternal Grandmother      Psychiatric exam is normal pleasant                    Review of Systems   10 point ROS of systems including  "Constitutional, Eyes, Respiratory, Cardiovascular, Gastroenterology, Genitourinary, Integumentary, Muscularskeletal, Psychiatric were all negative except for pertinent positives noted in my HPI.        Objective    /70 (BP Location: Left arm, Patient Position: Sitting, Cuff Size: Adult Regular)   Pulse 75   Temp 96.9  F (36.1  C) (Temporal)   Resp 16   Ht 1.824 m (5' 11.8\")   Wt 76.7 kg (169 lb)   SpO2 96%   BMI 23.05 kg/m    Body mass index is 23.05 kg/m .  Physical Exam   GENERAL: healthy, alert and no distress  NECK: no adenopathy, no asymmetry, masses, or scars and thyroid normal to palpation  RESP: lungs clear to auscultation - no rales, rhonchi or wheezes  CV: regular rate and rhythm, normal S1 S2, no S3 or S4, no murmur, click or rub, no peripheral edema and peripheral pulses strong  RECTAL (male): normal sphincter tone, no rectal masses, prostate increase in  size, smooth, nontender without nodules or masses  Left leg varicosities and  Varicosity as well  Psychiatric exam is normal, pleasant and insight and judgment is good    Patient Active Problem List   Diagnosis     Glioma (H)     Seizure disorder (H)     Family history of prostate cancer     Current Outpatient Medications   Medication     fluticasone (VERAMYST) 27.5 MCG/SPRAY spray     lamoTRIgine (LAMICTAL) 100 MG tablet     levETIRAcetam (KEPPRA) 500 MG tablet     multivitamin w/minerals (THERA-VIT-M) tablet     tadalafil (CIALIS) 5 MG tablet     No current facility-administered medications for this visit.     I have spent 50 minutes with the patient and spent another hour in taking care of the notes and reviewing the previous charts  "

## 2022-06-09 ENCOUNTER — OFFICE VISIT (OUTPATIENT)
Dept: FAMILY MEDICINE | Facility: CLINIC | Age: 57
End: 2022-06-09
Payer: COMMERCIAL

## 2022-06-09 ENCOUNTER — TELEPHONE (OUTPATIENT)
Dept: VASCULAR SURGERY | Facility: CLINIC | Age: 57
End: 2022-06-09

## 2022-06-09 VITALS
RESPIRATION RATE: 16 BRPM | BODY MASS INDEX: 22.89 KG/M2 | SYSTOLIC BLOOD PRESSURE: 105 MMHG | OXYGEN SATURATION: 96 % | DIASTOLIC BLOOD PRESSURE: 70 MMHG | WEIGHT: 169 LBS | HEART RATE: 75 BPM | TEMPERATURE: 96.9 F | HEIGHT: 72 IN

## 2022-06-09 DIAGNOSIS — R53.82 CHRONIC FATIGUE: ICD-10-CM

## 2022-06-09 DIAGNOSIS — N40.1 BENIGN PROSTATIC HYPERPLASIA WITH URINARY OBSTRUCTION: ICD-10-CM

## 2022-06-09 DIAGNOSIS — R14.3 FLATULENCE, ERUCTATION AND GAS PAIN: ICD-10-CM

## 2022-06-09 DIAGNOSIS — R14.2 FLATULENCE, ERUCTATION AND GAS PAIN: ICD-10-CM

## 2022-06-09 DIAGNOSIS — N52.2 DRUG-INDUCED ERECTILE DYSFUNCTION: ICD-10-CM

## 2022-06-09 DIAGNOSIS — K21.00 GASTROESOPHAGEAL REFLUX DISEASE WITH ESOPHAGITIS WITHOUT HEMORRHAGE: ICD-10-CM

## 2022-06-09 DIAGNOSIS — H81.20 VESTIBULAR NEURONITIS, UNSPECIFIED LATERALITY: ICD-10-CM

## 2022-06-09 DIAGNOSIS — U07.1 INFECTION DUE TO 2019 NOVEL CORONAVIRUS: ICD-10-CM

## 2022-06-09 DIAGNOSIS — N13.8 BENIGN PROSTATIC HYPERPLASIA WITH URINARY OBSTRUCTION: ICD-10-CM

## 2022-06-09 DIAGNOSIS — K90.41 NON-CELIAC GLUTEN SENSITIVITY: ICD-10-CM

## 2022-06-09 DIAGNOSIS — R61 GENERALIZED HYPERHIDROSIS: ICD-10-CM

## 2022-06-09 DIAGNOSIS — Z82.49 FAMILY HISTORY OF CORONARY ARTERIOSCLEROSIS: ICD-10-CM

## 2022-06-09 DIAGNOSIS — G40.909 SEIZURE DISORDER (H): ICD-10-CM

## 2022-06-09 DIAGNOSIS — C71.9 GLIOMA (H): Primary | ICD-10-CM

## 2022-06-09 DIAGNOSIS — Z12.5 SCREENING PSA (PROSTATE SPECIFIC ANTIGEN): ICD-10-CM

## 2022-06-09 DIAGNOSIS — R14.1 FLATULENCE, ERUCTATION AND GAS PAIN: ICD-10-CM

## 2022-06-09 DIAGNOSIS — S73.191S TEAR OF RIGHT ACETABULAR LABRUM, SEQUELA: ICD-10-CM

## 2022-06-09 DIAGNOSIS — I83.12 VARICOSE VEINS OF LEFT LOWER EXTREMITY WITH INFLAMMATION: ICD-10-CM

## 2022-06-09 DIAGNOSIS — L64.9 ANDROGENIC ALOPECIA: ICD-10-CM

## 2022-06-09 PROBLEM — S73.191A TEAR OF RIGHT ACETABULAR LABRUM: Status: ACTIVE | Noted: 2022-06-09

## 2022-06-09 PROCEDURE — 99204 OFFICE O/P NEW MOD 45 MIN: CPT | Performed by: INTERNAL MEDICINE

## 2022-06-09 RX ORDER — LEVETIRACETAM 500 MG/1
250 TABLET ORAL 2 TIMES DAILY
Status: ON HOLD | COMMUNITY
Start: 2022-06-03 | End: 2024-04-25

## 2022-06-09 RX ORDER — TADALAFIL 5 MG/1
5 TABLET ORAL DAILY PRN
COMMUNITY
End: 2022-06-09

## 2022-06-09 RX ORDER — TADALAFIL 5 MG/1
5 TABLET ORAL DAILY
Qty: 90 TABLET | Refills: 11 | Status: SHIPPED | OUTPATIENT
Start: 2022-06-09 | End: 2024-01-02

## 2022-06-09 RX ORDER — SACCHAROMYCES BOULARDII 250 MG
250 CAPSULE ORAL 2 TIMES DAILY
COMMUNITY
Start: 2022-06-09 | End: 2022-07-22

## 2022-06-09 RX ORDER — LAMOTRIGINE 100 MG/1
200 TABLET ORAL 2 TIMES DAILY
COMMUNITY
Start: 2022-05-04 | End: 2023-08-22

## 2022-06-09 RX ORDER — TADALAFIL 10 MG/1
10 TABLET ORAL DAILY PRN
COMMUNITY
Start: 2022-04-12 | End: 2022-06-09 | Stop reason: DRUGHIGH

## 2022-06-09 ASSESSMENT — PAIN SCALES - GENERAL: PAINLEVEL: NO PAIN (0)

## 2022-06-09 NOTE — Clinical Note
Yehuda Chun, thanks for sending this very complex but very nice patient to me.. I will try to do my best, he has been through so much.. Please let me know if I missed anything.. Thanks, Suyapa

## 2022-06-10 ENCOUNTER — TELEPHONE (OUTPATIENT)
Dept: OTOLARYNGOLOGY | Facility: CLINIC | Age: 57
End: 2022-06-10
Payer: COMMERCIAL

## 2022-06-10 NOTE — TELEPHONE ENCOUNTER
Health Call Center    Phone Message    May a detailed message be left on voicemail: yes     Reason for Call: Appointment Intake  Pt ref to ENT for balance, he is also experiencing plugged hearing as well as sinuses. Pt originally went in for balance after he had a brain tumor.     Please call pt to schedule accordingly. Dr. Verdin was highly recommended to him.    Thank you     Referring Provider Name: Suyapa Sinha MD in  [facility]    Diagnosis and/or Symptoms: Vestibular neuronitis, unspecified laterality   Action Taken: Message routed to:  [facility] ent    Travel Screening: Not Applicable

## 2022-06-13 DIAGNOSIS — R42 DIZZINESS: Primary | ICD-10-CM

## 2022-06-20 ENCOUNTER — TELEPHONE (OUTPATIENT)
Dept: FAMILY MEDICINE | Facility: CLINIC | Age: 57
End: 2022-06-20
Payer: COMMERCIAL

## 2022-06-20 DIAGNOSIS — Z82.49 FAMILY HISTORY OF CORONARY ARTERIOSCLEROSIS: Primary | ICD-10-CM

## 2022-06-20 NOTE — TELEPHONE ENCOUNTER
Reason for Call: Request for an order or referral:    Order or referral being requested: CT Calcium Screening     Date needed: as soon as possible    Has the patient been seen by the PCP for this problem? YES    Additional comments: Patient has an order that just needs to be extended. Order is .     Phone number Patient can be reached at:  Home number on file [phone] (home)    Best Time:  Any     Can we leave a detailed message on this number?  YES    Call taken on 2022 at 11:49 AM by Tere Story

## 2022-06-20 NOTE — TELEPHONE ENCOUNTER
Patient states that he was unable to schedule his CT calcium due to order has .    Order reviewed and it appears to have no expiration date.    Called scheduling at [facility].     Please reorder so that end date is one year. Defaulted to order that it was good for only 1 day. End and start date are the same day according to what schedulers look at.    Patient requests a call back once reordered so that he can call back to schedule.  Nikky Gant RN

## 2022-06-27 ENCOUNTER — LAB (OUTPATIENT)
Dept: LAB | Facility: CLINIC | Age: 57
End: 2022-06-27
Payer: COMMERCIAL

## 2022-06-27 ENCOUNTER — TELEPHONE (OUTPATIENT)
Dept: OTOLARYNGOLOGY | Facility: CLINIC | Age: 57
End: 2022-06-27

## 2022-06-27 DIAGNOSIS — R53.82 CHRONIC FATIGUE: ICD-10-CM

## 2022-06-27 DIAGNOSIS — N52.2 DRUG-INDUCED ERECTILE DYSFUNCTION: ICD-10-CM

## 2022-06-27 DIAGNOSIS — R14.3 FLATULENCE, ERUCTATION AND GAS PAIN: ICD-10-CM

## 2022-06-27 DIAGNOSIS — K90.41 NON-CELIAC GLUTEN SENSITIVITY: ICD-10-CM

## 2022-06-27 DIAGNOSIS — R14.1 FLATULENCE, ERUCTATION AND GAS PAIN: ICD-10-CM

## 2022-06-27 DIAGNOSIS — Z12.5 SCREENING PSA (PROSTATE SPECIFIC ANTIGEN): ICD-10-CM

## 2022-06-27 DIAGNOSIS — Z82.49 FAMILY HISTORY OF CORONARY ARTERIOSCLEROSIS: ICD-10-CM

## 2022-06-27 DIAGNOSIS — R14.2 FLATULENCE, ERUCTATION AND GAS PAIN: ICD-10-CM

## 2022-06-27 LAB
ALBUMIN SERPL-MCNC: 4.2 G/DL (ref 3.4–5)
ALP SERPL-CCNC: 57 U/L (ref 40–150)
ALT SERPL W P-5'-P-CCNC: 28 U/L (ref 0–70)
ANION GAP SERPL CALCULATED.3IONS-SCNC: 6 MMOL/L (ref 3–14)
AST SERPL W P-5'-P-CCNC: 15 U/L (ref 0–45)
BILIRUB SERPL-MCNC: 0.4 MG/DL (ref 0.2–1.3)
BUN SERPL-MCNC: 17 MG/DL (ref 7–30)
CALCIUM SERPL-MCNC: 9.3 MG/DL (ref 8.5–10.1)
CHLORIDE BLD-SCNC: 102 MMOL/L (ref 94–109)
CHOLEST SERPL-MCNC: 215 MG/DL
CO2 SERPL-SCNC: 31 MMOL/L (ref 20–32)
CORTIS SERPL-MCNC: 13.4 UG/DL
CREAT SERPL-MCNC: 1 MG/DL (ref 0.66–1.25)
DEPRECATED CALCIDIOL+CALCIFEROL SERPL-MC: 51 UG/L (ref 20–75)
ERYTHROCYTE [DISTWIDTH] IN BLOOD BY AUTOMATED COUNT: 12.3 % (ref 10–15)
FASTING STATUS PATIENT QL REPORTED: NO
GFR SERPL CREATININE-BSD FRML MDRD: 88 ML/MIN/1.73M2
GLUCOSE BLD-MCNC: 103 MG/DL (ref 70–99)
HCT VFR BLD AUTO: 47.6 % (ref 40–53)
HDLC SERPL-MCNC: 73 MG/DL
HGB BLD-MCNC: 15.7 G/DL (ref 13.3–17.7)
LDLC SERPL CALC-MCNC: 123 MG/DL
MCH RBC QN AUTO: 31.6 PG (ref 26.5–33)
MCHC RBC AUTO-ENTMCNC: 33 G/DL (ref 31.5–36.5)
MCV RBC AUTO: 96 FL (ref 78–100)
NONHDLC SERPL-MCNC: 142 MG/DL
PLATELET # BLD AUTO: 222 10E3/UL (ref 150–450)
POTASSIUM BLD-SCNC: 3.9 MMOL/L (ref 3.4–5.3)
PROT SERPL-MCNC: 7.3 G/DL (ref 6.8–8.8)
PSA SERPL-MCNC: 1.54 UG/L (ref 0–4)
RBC # BLD AUTO: 4.97 10E6/UL (ref 4.4–5.9)
SODIUM SERPL-SCNC: 139 MMOL/L (ref 133–144)
TRIGL SERPL-MCNC: 95 MG/DL
TSH SERPL DL<=0.005 MIU/L-ACNC: 1.94 MU/L (ref 0.4–4)
VIT B12 SERPL-MCNC: 498 PG/ML (ref 193–986)
WBC # BLD AUTO: 5.9 10E3/UL (ref 4–11)

## 2022-06-27 PROCEDURE — 82306 VITAMIN D 25 HYDROXY: CPT | Mod: 90 | Performed by: PATHOLOGY

## 2022-06-27 PROCEDURE — 82607 VITAMIN B-12: CPT | Performed by: PATHOLOGY

## 2022-06-27 PROCEDURE — 80053 COMPREHEN METABOLIC PANEL: CPT | Performed by: PATHOLOGY

## 2022-06-27 PROCEDURE — 85027 COMPLETE CBC AUTOMATED: CPT | Performed by: PATHOLOGY

## 2022-06-27 PROCEDURE — G0103 PSA SCREENING: HCPCS | Performed by: PATHOLOGY

## 2022-06-27 PROCEDURE — 84403 ASSAY OF TOTAL TESTOSTERONE: CPT | Mod: 90 | Performed by: PATHOLOGY

## 2022-06-27 PROCEDURE — 84443 ASSAY THYROID STIM HORMONE: CPT | Performed by: PATHOLOGY

## 2022-06-27 PROCEDURE — 36415 COLL VENOUS BLD VENIPUNCTURE: CPT | Performed by: PATHOLOGY

## 2022-06-27 PROCEDURE — 99000 SPECIMEN HANDLING OFFICE-LAB: CPT | Performed by: PATHOLOGY

## 2022-06-27 PROCEDURE — 80061 LIPID PANEL: CPT | Performed by: PATHOLOGY

## 2022-06-27 PROCEDURE — 82533 TOTAL CORTISOL: CPT | Mod: 90 | Performed by: PATHOLOGY

## 2022-06-27 NOTE — LETTER
"June 27, 2022      EDSON Ocampo  [address] [place], [place] [address]   [facility] [address]        Dear ,    We are writing to inform you of your test results.    - Normal CBC (white blood cells, hemoglobin and platelets)   - Normal electrolytes, kidney function and liver enzymes   - Normal HDL (\"good cholesterol\")   - Elevated LDL (\"bad cholesterol\") and total cholesterol. Recommend to reduce saturated fats.  This means less fried foods and meat. Also, increase dietary fiber intake by eating more fruits, vegetables and whole grains.   - Normal Prostate specific antigen which is a test to screen for prostate cancer   - Normal thyroid test   - Normal vitamin B12  levels.        Resulted Orders   TSH with free T4 reflex   Result Value Ref Range    TSH 1.94 0.40 - 4.00 mU/L   CBC with Platelets     Result Value Ref Range    WBC Count 5.9 4.0 - 11.0 10e3/uL    RBC Count 4.97 4.40 - 5.90 10e6/uL    Hemoglobin 15.7 13.3 - 17.7 g/dL    Hematocrit 47.6 40.0 - 53.0 %    MCV 96 78 - 100 fL    MCH 31.6 26.5 - 33.0 pg    MCHC 33.0 31.5 - 36.5 g/dL    RDW 12.3 10.0 - 15.0 %    Platelet Count 222 150 - 450 10e3/uL   Comprehensive metabolic panel   Result Value Ref Range    Sodium 139 133 - 144 mmol/L    Potassium 3.9 3.4 - 5.3 mmol/L    Chloride 102 94 - 109 mmol/L    Carbon Dioxide (CO2) 31 20 - 32 mmol/L    Anion Gap 6 3 - 14 mmol/L    Urea Nitrogen 17 7 - 30 mg/dL    Creatinine 1.00 0.66 - 1.25 mg/dL    Calcium 9.3 8.5 - 10.1 mg/dL    Glucose 103 (H) 70 - 99 mg/dL    Alkaline Phosphatase 57 40 - 150 U/L    AST 15 0 - 45 U/L    ALT 28 0 - 70 U/L    Protein Total 7.3 6.8 - 8.8 g/dL    Albumin 4.2 3.4 - 5.0 g/dL    Bilirubin Total 0.4 0.2 - 1.3 mg/dL    GFR Estimate 88 >60 mL/min/1.73m2      Comment:      Effective December 21, 2021 eGFRcr in adults is calculated using the 2021 CKD-EPI creatinine equation which includes age and gender ( et al., NEJM, DOI: 10.1056/SMONgs2777207)   Vitamin B12   Result Value Ref Range    " Vitamin B12 498 193 - 986 pg/mL   Lipid panel reflex to direct LDL Fasting   Result Value Ref Range    Cholesterol 215 (H) <200 mg/dL    Triglycerides 95 <150 mg/dL    Direct Measure HDL 73 >=40 mg/dL    LDL Cholesterol Calculated 123 (H) <=100 mg/dL    Non HDL Cholesterol 142 (H) <130 mg/dL    Patient Fasting > 8hrs? No     Narrative    Cholesterol  Desirable:  <200 mg/dL    Triglycerides  Normal:  Less than 150 mg/dL  Borderline High:  150-199 mg/dL  High:  200-499 mg/dL  Very High:  Greater than or equal to 500 mg/dL    Direct Measure HDL  Female:  Greater than or equal to 50 mg/dL   Male:  Greater than or equal to 40 mg/dL    LDL Cholesterol  Desirable:  <100mg/dL  Above Desirable:  100-129 mg/dL   Borderline High:  130-159 mg/dL   High:  160-189 mg/dL   Very High:  >= 190 mg/dL    Non HDL Cholesterol  Desirable:  130 mg/dL  Above Desirable:  130-159 mg/dL  Borderline High:  160-189 mg/dL  High:  190-219 mg/dL  Very High:  Greater than or equal to 220 mg/dL   PSA, screen   Result Value Ref Range    Prostate Specific Antigen Screen 1.54 0.00 - 4.00 ug/L    Narrative    Assay Method:  Chemiluminescence using Siemens   Vista analyzer.       If you have any questions or concerns, please call the clinic at the number listed above.       Sincerely,      Suyapa Sinha MD

## 2022-06-27 NOTE — RESULT ENCOUNTER NOTE
"Covering for Dr Sinha, please call patient for results.  - Normal CBC (white blood cells, hemoglobin and platelets)  - Normal electrolytes, kidney function and liver enzymes   - Normal HDL (\"good cholesterol\")  - Elevated LDL (\"bad cholesterol\") and total cholesterol. Recommend to reduce saturated fats.  This means less fried foods and meat. Also, increase dietary fiber intake by eating more fruits, vegetables and whole grains.   - Normal Prostate specific antigen which is a test to screen for prostate cancer  - Normal thyroid test   - Normal vitamin B12  levels.     Dr Laine ZHENG [facility]        "

## 2022-06-27 NOTE — TELEPHONE ENCOUNTER
[facility] Call Center    Phone Message    May a detailed message be left on voicemail: yes     Reason for Call: Other: Pt called into schedule his appt from his referall for Vestibular neuronitis, unspecified laterality, ref'd by  Suyapa Sinha MD Please call to discuss. Thank you    Action Taken: Message routed to:  [facility] ([facility] ENT    Travel Screening: Not Applicable

## 2022-06-29 LAB — TESTOST SERPL-MCNC: 496 NG/DL (ref 240–950)

## 2022-06-29 NOTE — RESULT ENCOUNTER NOTE
Ernesto Chavez,    I have had the opportunity to review your recent results and an interpretation is as follows:  Your testosterone levels returned within normal limits    Sincerely,  Joss Lyman MD

## 2022-06-30 ENCOUNTER — HOSPITAL ENCOUNTER (OUTPATIENT)
Dept: CT IMAGING | Facility: CLINIC | Age: 57
Discharge: HOME | End: 2022-06-30
Attending: INTERNAL MEDICINE | Admitting: INTERNAL MEDICINE

## 2022-06-30 DIAGNOSIS — Z82.49 FAMILY HISTORY OF CORONARY ARTERIOSCLEROSIS: ICD-10-CM

## 2022-06-30 PROCEDURE — 75571 CT HRT W/O DYE W/CA TEST: CPT

## 2022-06-30 PROCEDURE — 75571 CT HRT W/O DYE W/CA TEST: CPT | Mod: 26 | Performed by: INTERNAL MEDICINE

## 2022-07-01 ENCOUNTER — DOCUMENTATION ONLY (OUTPATIENT)
Dept: LAB | Facility: CLINIC | Age: 57
End: 2022-07-01
Payer: COMMERCIAL

## 2022-07-01 DIAGNOSIS — E78.5 HYPERLIPIDEMIA LDL GOAL <100: Primary | ICD-10-CM

## 2022-07-01 PROCEDURE — 36415 COLL VENOUS BLD VENIPUNCTURE: CPT | Performed by: INTERNAL MEDICINE

## 2022-07-01 PROCEDURE — 80061 LIPID PANEL: CPT | Performed by: INTERNAL MEDICINE

## 2022-07-01 NOTE — PROGRESS NOTES
Ernesto Sinha    The patient came to lab for Lipid panel. He said that he was not fasting last time and he is asked to come back for this test. However, there is not the order for him. I collected his blood, could you please place an order for him? Thanks     Lyle

## 2022-07-02 ENCOUNTER — MYC MEDICAL ADVICE (OUTPATIENT)
Dept: FAMILY MEDICINE | Facility: CLINIC | Age: 57
End: 2022-07-02

## 2022-07-02 DIAGNOSIS — S39.92XA INJURY OF BACK, INITIAL ENCOUNTER: Primary | ICD-10-CM

## 2022-07-02 LAB
CHOLEST SERPL-MCNC: 230 MG/DL
FASTING STATUS PATIENT QL REPORTED: YES
HDLC SERPL-MCNC: 87 MG/DL
LDLC SERPL CALC-MCNC: 132 MG/DL
NONHDLC SERPL-MCNC: 143 MG/DL
TRIGL SERPL-MCNC: 57 MG/DL

## 2022-07-06 NOTE — TELEPHONE ENCOUNTER
FUTURE VISIT INFORMATION      FUTURE VISIT INFORMATION:    Date: 9/20/22    Time: 1:30PM    [place] [facility] [place]ERRAL INFORMATION:    Referring provider:  Suyapa Sinha MD    Referring providers clinic:  [facility]     Reason for visit/diagnosis  Vertigo- Referred by Suyapa Sinha MD in  [facility]. Dr. Callahan already read records. No intake needed    RECORDS REQUESTED FROM:       Clinic name Comments Records Status Imaging Status     [facility]  6/9/22 note from Suyapa Sinha MD [facility]    Department of Neurology in [place], [place]   3/17/22 note from Andrea Vu M.D.   Care everywhere    [facility]  3/17/22 MR Brain   1/4/22  MR Brain   6/1/21  MR Brain   12/4/2020  MR Brain   6/25/2020  MR Brain  Care everywhere re 8/12/22 - PACS    [facility]  10/16/18 MR Brain   7/10/18  MR Brain   4/25/18  MR Brain  Care everywhere req 8/12/22 - PACS                 8/12/22 11:07AM sent a fax for images - [facility]   8/30/22 9:09PM images received in PACS - [facility]

## 2022-07-22 ENCOUNTER — OFFICE VISIT (OUTPATIENT)
Dept: FAMILY MEDICINE | Facility: CLINIC | Age: 57
End: 2022-07-22
Payer: COMMERCIAL

## 2022-07-22 VITALS
RESPIRATION RATE: 16 BRPM | SYSTOLIC BLOOD PRESSURE: 114 MMHG | DIASTOLIC BLOOD PRESSURE: 78 MMHG | HEIGHT: 72 IN | OXYGEN SATURATION: 97 % | WEIGHT: 168 LBS | HEART RATE: 61 BPM | TEMPERATURE: 97.1 F | BODY MASS INDEX: 22.75 KG/M2

## 2022-07-22 DIAGNOSIS — K90.41 NON-CELIAC GLUTEN SENSITIVITY: ICD-10-CM

## 2022-07-22 DIAGNOSIS — Z80.42 FAMILY HISTORY OF PROSTATE CANCER: ICD-10-CM

## 2022-07-22 DIAGNOSIS — Z00.00 ROUTINE GENERAL MEDICAL EXAMINATION AT A HEALTH CARE FACILITY: Primary | ICD-10-CM

## 2022-07-22 DIAGNOSIS — G40.909 SEIZURE DISORDER (H): ICD-10-CM

## 2022-07-22 DIAGNOSIS — S32.009D CLOSED FRACTURE OF TRANSVERSE PROCESS OF LUMBAR VERTEBRA WITH ROUTINE HEALING, SUBSEQUENT ENCOUNTER: ICD-10-CM

## 2022-07-22 DIAGNOSIS — N52.2 DRUG-INDUCED ERECTILE DYSFUNCTION: ICD-10-CM

## 2022-07-22 DIAGNOSIS — N40.1 BENIGN PROSTATIC HYPERPLASIA WITH LOWER URINARY TRACT SYMPTOMS, SYMPTOM DETAILS UNSPECIFIED: ICD-10-CM

## 2022-07-22 DIAGNOSIS — K21.00 GASTROESOPHAGEAL REFLUX DISEASE WITH ESOPHAGITIS WITHOUT HEMORRHAGE: ICD-10-CM

## 2022-07-22 DIAGNOSIS — C71.9 GLIOMA (H): ICD-10-CM

## 2022-07-22 DIAGNOSIS — M54.2 NECK PAIN: ICD-10-CM

## 2022-07-22 DIAGNOSIS — E78.5 HYPERLIPIDEMIA LDL GOAL <100: ICD-10-CM

## 2022-07-22 PROCEDURE — 99214 OFFICE O/P EST MOD 30 MIN: CPT | Mod: 25 | Performed by: INTERNAL MEDICINE

## 2022-07-22 PROCEDURE — 91305 COVID-19,PF,PFIZER (12+ YRS): CPT | Performed by: INTERNAL MEDICINE

## 2022-07-22 PROCEDURE — 99396 PREV VISIT EST AGE 40-64: CPT | Mod: 25 | Performed by: INTERNAL MEDICINE

## 2022-07-22 PROCEDURE — 0054A COVID-19,PF,PFIZER (12+ YRS): CPT | Performed by: INTERNAL MEDICINE

## 2022-07-22 RX ORDER — PANTOPRAZOLE SODIUM 40 MG/1
40 TABLET, DELAYED RELEASE ORAL 2 TIMES DAILY
Qty: 180 TABLET | Refills: 3 | Status: SHIPPED | OUTPATIENT
Start: 2022-07-22 | End: 2023-08-22

## 2022-07-22 ASSESSMENT — ENCOUNTER SYMPTOMS
ARTHRALGIAS: 1
HEARTBURN: 1
MYALGIAS: 1

## 2022-07-22 ASSESSMENT — PAIN SCALES - GENERAL: PAINLEVEL: MODERATE PAIN (4)

## 2022-07-22 NOTE — PROGRESS NOTES
SUBJECTIVE:   CC: Scott Ocampo is an 57 year old male who presents for preventative health visit.   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 followed by CCRT (temozolomide) then 4cycles adjuvant TMZ 2016, recurrence in 2017 treated with CCRT then 12 cycles of adjuvant TMZ   At present he has Semi annual visits at [facility] and MRI    Left sided weakness is residual  Has done neuro physical therapy     keprra being tapered because he was noticing fatigue from it    Currently dealing with ringing in rt ear and  Vestibular neuronitis  Seen ENT    Prostate symptoms with Cialis have improved  His left hip continues to hurt    But the main issue is that 2 weeks ago he developed L2 transverse process fracture after a fall  It was a horrible fall while he was unloading the boat  He was seen in orthopedics urgent care where he was diagnosed with x-ray  He has a back support now and was seen yesterday  He is taking ibuprofen for pain control and a muscle relaxant    His seizures are not controlled they are focal and sensory in the left side  His  divorce is finalized  He has a new job which is quite stressful  He is building up a busy company  He is sleeping well        Patient has been advised of split billing requirements and indicates understanding: Yes  Healthy Habits:     Getting at least 3 servings of Calcium per day:  Yes    Bi-annual eye exam:  Yes    Dental care twice a year:  NO    Sleep apnea or symptoms of sleep apnea:  None    Diet:  Gluten-free/reduced    Frequency of exercise:  2-3 days/week    Duration of exercise:  15-30 minutes    Taking medications regularly:  Yes    Barriers to taking medications:  None    Medication side effects:  No significant flushing and Other    PHQ-2 Total Score: 2    Additional concerns today:  No      Today's PHQ-2 Score:    PHQ-2 ( 1999 Pfizer) 7/22/2022   Q1: Little interest or pleasure in doing things 1   Q2: Feeling down, depressed or hopeless 1   PHQ-2 Score 2   Q1: Little interest or pleasure in doing things Several days   Q2: Feeling down, depressed or hopeless Several days   PHQ-2 Score 2       Abuse: Current or Past(Physical, Sexual or Emotional)- No  Do you feel safe in your environment? Yes    Have you ever done Advance Care Planning? (For example, a Health Directive, POLST, or a discussion with a medical provider or your loved ones about your wishes): Yes, patient states has an Advance Care Planning document and will bring a copy to the clinic.    Social History     Tobacco Use     Smoking status: Never Smoker     Smokeless tobacco: Never Used   Substance Use Topics     Alcohol use: Yes     Comment: 2-3x week     If you drink alcohol do you typically have >3 drinks per day or >7 drinks per week? No    Alcohol Use 7/22/2022   Prescreen: >3 drinks/day or >7 drinks/week? Yes   Prescreen: >3 drinks/day or >7 drinks/week? -   AUDIT SCORE  5     AUDIT - Alcohol Use Disorders Identification Test - Reproduced from the World Health Organization Audit 2001 (Second Edition) 7/22/2022   1.  How often do you have a drink containing alcohol? 4 or more times a week   2.  How many drinks containing alcohol do you have on a typical day when you are drinking? 1 or 2   3.  How often do you have five or more drinks on one occasion? Less than monthly   4.  How often during the last year have you found that you were not able to stop drinking once you had started? Never   5.  How often during the last year have you failed to do what was normally expected of you because of drinking? Never   6.  How often during the last year have you needed a first drink in the morning to get yourself going after a heavy drinking session? Never   7.  How often during the last year have you had a feeling of guilt or remorse after drinking? Never   8.  How often  during the last year have you been unable to remember what happened the night before because of your drinking? Never   9.  Have you or someone else been injured because of your drinking? No   10. Has a relative, friend, doctor or other health care worker been concerned about your drinking or suggested you cut down? No   TOTAL SCORE 5       Last PSA:   Prostate Specific Antigen Screen   Date Value Ref Range Status   06/27/2022 1.54 0.00 - 4.00 ug/L Final       Reviewed orders with patient. Reviewed health maintenance and updated orders accordingly - Yes  BP Readings from Last 3 Encounters:   07/22/22 114/78   06/09/22 105/70   05/15/18 112/71    Wt Readings from Last 3 Encounters:   07/22/22 76.2 kg (168 lb)   06/09/22 76.7 kg (169 lb)   05/15/18 81.9 kg (180 lb 9.6 oz)                  Patient Active Problem List   Diagnosis     Glioma (H)     Seizure disorder (H)     Family history of prostate cancer     Gastroesophageal reflux disease with esophagitis without hemorrhage     Non-celiac gluten sensitivity     Drug-induced erectile dysfunction     Chronic fatigue     Vestibular neuronitis, unspecified laterality     Tear of right acetabular labrum     Family history of coronary arteriosclerosis     Benign prostatic hyperplasia with lower urinary tract symptoms, symptom details unspecified     Past Surgical History:   Procedure Laterality Date     BRAIN SURGERY  07/2016     HEAD & NECK SURGERY      partial brain tumor resection     HERNIORRHAPHY UMBILICAL N/A 5/15/2018    Procedure: HERNIORRHAPHY UMBILICAL;;  Surgeon: Blake Whitley MD;  Location: [facility]     LAPAROSCOPIC HERNIORRHAPHY INGUINAL BILATERAL Bilateral 5/15/2018    Procedure: LAPAROSCOPIC HERNIORRHAPHY INGUINAL BILATERAL;  LAPAROSCOPIC  BILATERAL  INGUINAL HERNIA REPAIR WITH MESH, OPEN UMBILICAL HERNIA REPAIR WITH MESH;  Surgeon: Blake Whitley MD;  Location: [facility]     ORTHOPEDIC SURGERY      left labral tear repair       Social History      Tobacco Use     Smoking status: Never Smoker     Smokeless tobacco: Never Used   Substance Use Topics     Alcohol use: Yes     Comment: 2-3x week     Family History   Problem Relation Age of Onset     Hypertension Mother      Hip dysplasia Mother      Coronary Artery Disease Father 50     Hyperlipidemia Father      Prostate Cancer Father 70     Other Cancer Father      Lung Cancer Father 80        non smoker     Celiac Disease Sister      Osteogenesis imperfecta Brother      Substance Abuse Maternal Grandmother      Cerebrovascular Disease Paternal Grandmother          Current Outpatient Medications   Medication Sig Dispense Refill     Calcium Carbonate Antacid (TUMS PO) Take 1 tablet by mouth as needed       fluticasone (VERAMYST) 27.5 MCG/SPRAY spray Spray 2 sprays into both nostrils daily as needed for rhinitis or allergies       lamoTRIgine (LAMICTAL) 100 MG tablet Take 200 mg by mouth 2 times daily       levETIRAcetam (KEPPRA) 500 MG tablet Take 250 mg by mouth 2 times daily       pantoprazole (PROTONIX) 40 MG EC tablet Take 1 tablet (40 mg) by mouth 2 times daily 180 tablet 3     tadalafil (CIALIS) 5 MG tablet Take 1 tablet (5 mg) by mouth daily 90 tablet 11     Allergies   Allergen Reactions     Gramineae Pollens Other (See Comments)     sneezing       Reviewed and updated as needed this visit by clinical staff   Tobacco  Allergies  Meds  Problems  Med Hx   Fam Hx            Reviewed and updated as needed this visit by Provider     Meds  Problems  Med Hx   Fam Hx           Past Medical History:   Diagnosis Date     DDD (degenerative disc disease), cervical      Family history of prostate cancer      FH: chemotherapy      Gastroesophageal reflux disease      Glioma (H)      S/P radiation therapy      Seasonal allergies      Seizures (H)      Tear of right acetabular labrum      Uncomplicated asthma     exercise induced     Varicose vein of leg       Past Surgical History:   Procedure Laterality  "Date     BRAIN SURGERY  07/2016     HEAD & NECK SURGERY      partial brain tumor resection     HERNIORRHAPHY UMBILICAL N/A 5/15/2018    Procedure: HERNIORRHAPHY UMBILICAL;;  Surgeon: Blake Whitley MD;  Location: [facility]     LAPAROSCOPIC HERNIORRHAPHY INGUINAL BILATERAL Bilateral 5/15/2018    Procedure: LAPAROSCOPIC HERNIORRHAPHY INGUINAL BILATERAL;  LAPAROSCOPIC  BILATERAL  INGUINAL HERNIA REPAIR WITH MESH, OPEN UMBILICAL HERNIA REPAIR WITH MESH;  Surgeon: Blake Whitley MD;  Location: [facility]     ORTHOPEDIC SURGERY      left labral tear repair       Review of Systems  10 point ROS of systems including Constitutional, Eyes, Respiratory, Cardiovascular, Gastroenterology, Genitourinary, Integumentary, Muscularskeletal, Psychiatric were all negative except for pertinent positives noted in my HPI.      OBJECTIVE:   /78 (BP Location: Right arm, Patient Position: Sitting, Cuff Size: Adult Regular)   Pulse 61   Temp 97.1  F (36.2  C) (Temporal)   Resp 16   Ht 1.819 m (5' 11.6\")   Wt 76.2 kg (168 lb)   SpO2 97%   BMI 23.04 kg/m      Physical Exam  GENERAL: healthy, alert and no distress  EYES: Eyes grossly normal to inspection, PERRL and conjunctivae and sclerae normal  HENT: ear canals and TM's normal, nose and mouth without ulcers or lesions  NECK: no adenopathy, no asymmetry, masses, or scars and thyroid normal to palpation  RESP: lungs clear to auscultation - no rales, rhonchi or wheezes  CV: regular rate and rhythm, normal S1 S2, no S3 or S4, no murmur, click or rub, no peripheral edema and peripheral pulses strong  ABDOMEN: soft, nontender, no hepatosplenomegaly, no masses and bowel sounds normal   (male): normal male genitalia without lesions or urethral discharge, no hernia  RECTAL: normal sphincter tone, no rectal masses, prostate normal size, smooth, nontender without nodules or masses  MS: no gross musculoskeletal defects noted, no edema  SKIN: no suspicious lesions or rashes  NEURO: " Normal strength and tone, mentation intact and speech normal  PSYCH: mentation appears normal, affect normal/bright    Varicose veins      ASSESSMENT/PLAN:   Scott was seen today for physical.    Diagnoses and all orders for this visit:    Routine general medical examination at a health care facility  Very pleasant 57 years old who is up-to-date on colonoscopy  We will update him on COVID immunization booster  He manages well with pain and is managing well with stress and depression  He will see vascular surgery for varicose veins  And orthopedics for his acetabular pain  Closed fracture of transverse process of lumbar vertebra with routine healing, subsequent encounter  -     Chiropractic Referral; Future  We will do a DEXA scan after obtaining the x-rays to see if he has osteopenia will not  Blood present I will obtain orthopedics records  I will add proton pump inhibitors because of his NSAID use and known GERD  Glioma (H)  Remains in remission fortunately  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 followed by CCRT (temozolomide) then 4cycles adjuvant TMZ 2016, recurrence in 2017 treated with CCRT then 12 cycles of adjuvant TMZ   At present he has Semi annual visits at [facility] and MR    Seizure disorder (H)  Patient is on Lamictal now and Keppra is being tapered  Drug-induced erectile dysfunction  -     Testosterone Free and Total; Future  Cialis has helped him  Neck pain  -     Chiropractic Referral; Future  Patient is advised not to do manipulation but acupuncture can be tried  Non-celiac gluten sensitivity  On gluten-free diet and I will send him to dietitian the grains that he can eat  Hyperlipidemia LDL goal <100  Excellent CT coronary calcium score  Gastroesophageal reflux disease with esophagitis without hemorrhage  -     pantoprazole (PROTONIX) 40 MG EC tablet; Take 1 tablet (40 mg) by mouth 2 times daily  Watch for worsening symptoms  "while on nonsteroidals  Carafate can also be used  Family history of prostate cancer  PSA is normal and exam is pretty good today  Benign prostatic hyperplasia with lower urinary tract symptoms, symptom details unspecified  Patient has benefited from Cialis alone and no further treatment is required at present  Other orders  -     REVIEW OF HEALTH MAINTENANCE PROTOCOL ORDERS  -     COVID-19,BAKARI,PFIZER (12+ YRS)            COUNSELING:   Reviewed preventive health counseling, as reflected in patient instructions       Prostate cancer screening       Osteoporosis prevention/bone health    Estimated body mass index is 23.04 kg/m  as calculated from the following:    Height as of this encounter: 1.819 m (5' 11.6\").    Weight as of this encounter: 76.2 kg (168 lb).         He reports that he has never smoked. He has never used smokeless tobacco.      Counseling Resources:  ATP IV Guidelines  Pooled Cohorts Equation Calculator  FRAX Risk Assessment  ICSI Preventive Guidelines  Dietary Guidelines for Americans, 2010  USDA's MyPlate  ASA Prophylaxis  Lung CA Screening    Suyapa Sinha MD  [facility]  "

## 2022-08-02 ENCOUNTER — OFFICE VISIT (OUTPATIENT)
Dept: VASCULAR SURGERY | Facility: CLINIC | Age: 57
End: 2022-08-02
Payer: COMMERCIAL

## 2022-08-02 DIAGNOSIS — I83.812 VARICOSE VEINS OF LEFT LOWER EXTREMITY WITH PAIN: Primary | ICD-10-CM

## 2022-08-02 PROCEDURE — 99203 OFFICE O/P NEW LOW 30 MIN: CPT | Performed by: SURGERY

## 2022-08-02 NOTE — NURSING NOTE
Patient Reported symptoms:    Right leg   Heaviness None of the time   Achiness None of the time   Swelling Some of the time   Throbbing None of the time   Itching None of the time   Appearance Slightly noticeable   Impact on work/activities Symptoms but full able to participate    Left Leg   Heaviness All of the time  Achiness Some of the time   Swelling Some of the time   Throbbing None of the time   Itching A little of the time   Appearance Moderately noticeable   Impact on work/activities Symptoms but full able to participate

## 2022-08-02 NOTE — PROGRESS NOTES
VEINSOLUTIONS CONSULTATION    HPI:    Scott Ocampo is a pleasant 57 year old male referred by Dr. Mary Jane Sinha for evaluation of bilateral lower extremity varicose veins with left leg pain.  He first noticed varicose veins in 2010 and said they have not progressed very much.  Pain medial to the left knee is described as achiness, worse after sitting for long periods of time and improving during activities.  He has worn compression hose for a few years but not recently.    He has no history of deep vein thrombosis, superficial thrombophlebitis or hemorrhage.  He admits to some left lower extremity swelling.    His family history is significant pulmonary embolism in his mother who had the first event about 50 years ago and the second about 30 years ago.  He is not aware if she was ever tested for a hypercoagulable state.  He has no siblings who have had DVT or PE.  His mother's nephew had a pulmonary embolism around the age of 57.    PAST MEDICAL HISTORY:   Past Medical History:   Diagnosis Date     DDD (degenerative disc disease), cervical      Family history of prostate cancer      FH: chemotherapy      Gastroesophageal reflux disease      Glioma (H)      S/P radiation therapy      Seasonal allergies      Seizures (H)      Tear of right acetabular labrum      Uncomplicated asthma     exercise induced     Varicose vein of leg        PAST SURGICAL HISTORY:   Past Surgical History:   Procedure Laterality Date     BRAIN SURGERY  07/2016     HEAD & NECK SURGERY      partial brain tumor resection     HERNIORRHAPHY UMBILICAL N/A 5/15/2018    Procedure: HERNIORRHAPHY UMBILICAL;;  Surgeon: Blake Whitley MD;  Location: [facility]     LAPAROSCOPIC HERNIORRHAPHY INGUINAL BILATERAL Bilateral 5/15/2018    Procedure: LAPAROSCOPIC HERNIORRHAPHY INGUINAL BILATERAL;  LAPAROSCOPIC  BILATERAL  INGUINAL HERNIA REPAIR WITH MESH, OPEN UMBILICAL HERNIA REPAIR WITH MESH;  Surgeon: Blake Whitley MD;  Location: [facility]      ORTHOPEDIC SURGERY      left labral tear repair       FAMILY HISTORY:   Family History   Problem Relation Age of Onset     Hypertension Mother      Hip dysplasia Mother      Coronary Artery Disease Father 50     Hyperlipidemia Father      Prostate Cancer Father 70     Other Cancer Father      Lung Cancer Father 80        non smoker     Celiac Disease Sister      Osteogenesis imperfecta Brother      Substance Abuse Maternal Grandmother      Cerebrovascular Disease Paternal Grandmother        SOCIAL HISTORY:   Social History     Tobacco Use     Smoking status: Never Smoker     Smokeless tobacco: Never Used   Substance Use Topics     Alcohol use: Yes     Comment: 2-3x week       REVIEW OF SYSTEMS: Review Of Systems  Skin: negative  Eyes: negative  Ears/Nose/Throat: hearing loss  Respiratory: No shortness of breath, dyspnea on exertion, cough, or hemoptysis  Cardiovascular: negative  Gastrointestinal: Indigestion/heartburn  Genitourinary: negative  Musculoskeletal: Neck pain, foot pain, lower extremity pain, weakness, leg swelling  Neurologic: Left-sided weakness secondary to tumor, seizures secondary to tumor  Psychiatric: negative  Hematologic/Lymphatic/Immunologic: negative  Endocrine: negative      Vital signs:  There were no vitals taken for this visit.    Current Outpatient Medications   Medication Sig Dispense Refill     Calcium Carbonate Antacid (TUMS PO) Take 1 tablet by mouth as needed       fluticasone (VERAMYST) 27.5 MCG/SPRAY spray Spray 2 sprays into both nostrils daily as needed for rhinitis or allergies       lamoTRIgine (LAMICTAL) 100 MG tablet Take 200 mg by mouth 2 times daily       levETIRAcetam (KEPPRA) 500 MG tablet Take 250 mg by mouth 2 times daily       pantoprazole (PROTONIX) 40 MG EC tablet Take 1 tablet (40 mg) by mouth 2 times daily 180 tablet 3     tadalafil (CIALIS) 5 MG tablet Take 1 tablet (5 mg) by mouth daily 90 tablet 11       PHYSICAL EXAM:  General: Pleasant, fit, NAD.   HEENT:  Normocephalic, atraumatic, external ears and nose normal.   Respiratory: Normal respiratory effort.   Cardiovascular: Pulse is regular.   Musculoskeletal: Gait and station normal.  The joints of his fingers and toes without deformity.  Some deformity of his knee joints bilaterally with a bony protuberance in the left infrapatellar area.  There is no cyanosis of his nailbeds.   EXTREMITIES: Right lower extremity: 3 to 4 mm varicosities scattered over the medial right calf.  8 mm protuberance of the distal third of the medial right leg consistent with an incompetent perforating vein.  No stasis changes or edema.    Left lower extremity: Palpable vein coursing down the medial left thigh.  3 to 4 mm varicosities about the left posterior medial calf.  The great saphenous vein is palpable coursing through this area.  Fascial defect on the left medial distal leg consistent with  disease.  No stasis changes.  Trace edema of the left ankle..    PULSES: R/L (3=normal pulse, 0=no palpable pulse) dorsalis pedis: 3/3; posterior tibial: 3/3.      Neurologic: Grossly normal  Psychiatric: Mood, affect, judgment and insight are normal     ASSESSMENT:  Mildly symptomatic left lower extremity varicose veins with virtually asymptomatic right lower extremity varicose veins.  These do not appear to be interfering with his actives of daily living and he has suffered no significant complications from them.    The anatomy lower extremity veins, the pathophysiology of venous insufficiency and the option of continued conservative management with small risk of superficial thrombophlebitis, bleeding and progression of disease process were discussed.    Briefly discussed treatment options for superficial venous insufficiency with endovenous ablation in the office setting.  Management of branch tributaries with either phlebectomy or sclerotherapy were discussed.  After lengthy discussion, he feels that observation is reasonable option  for him at this time.    PLAN:  Conservative measures with continued exercise, leg elevation, dietary measures and compression hose.  He will return on an as-needed basis.     Raúl Celaya MD    Dictated using Dragon voice recognition software which may result in transcription errors          [facility] LEG DRAWING:

## 2022-08-02 NOTE — LETTER
8/2/2022         RE: Scott Ocampo  [address] [place] [address] [address]  [facility] [address]        Dear Colleague,    Thank you for referring your patient, Scott Ocampo, to the [facility] [place]. Please see a copy of my visit note below.    VE[facility]OLUTIONS CONSULTATION    HPI:    Scott Ocampo is a pleasant 57 year old male referred by Dr. Mary Jane Sinha for evaluation of bilateral lower extremity varicose veins with left leg pain.  He first noticed varicose veins in 2010 and said they have not progressed very much.  Pain medial to the left knee is described as achiness, worse after sitting for long periods of time and improving during activities.  He has worn compression hose for a few years but not recently.    He has no history of deep vein thrombosis, superficial thrombophlebitis or hemorrhage.  He admits to some left lower extremity swelling.    His family history is significant pulmonary embolism in his mother who had the first event about 50 years ago and the second about 30 years ago.  He is not aware if she was ever tested for a hypercoagulable state.  He has no siblings who have had DVT or PE.  His mother's nephew had a pulmonary embolism around the age of 57.    PAST MEDICAL HISTORY:   Past Medical History:   Diagnosis Date     DDD (degenerative disc disease), cervical      Family history of prostate cancer      FH: chemotherapy      Gastroesophageal reflux disease      Glioma (H)      S/P radiation therapy      Seasonal allergies      Seizures (H)      Tear of right acetabular labrum      Uncomplicated asthma     exercise induced     Varicose vein of leg        PAST SURGICAL HISTORY:   Past Surgical History:   Procedure Laterality Date     BRAIN SURGERY  07/2016     HEAD & NECK SURGERY      partial brain tumor resection     HERNIORRHAPHY UMBILICAL N/A 5/15/2018    Procedure: HERNIORRHAPHY UMBILICAL;;  Surgeon: Blake Whitley MD;  Location: [facility]     LAPAROSCOPIC  HERNIORRHAPHY INGUINAL BILATERAL Bilateral 5/15/2018    Procedure: LAPAROSCOPIC HERNIORRHAPHY INGUINAL BILATERAL;  LAPAROSCOPIC  BILATERAL  INGUINAL HERNIA REPAIR WITH MESH, OPEN UMBILICAL HERNIA REPAIR WITH MESH;  Surgeon: Blake Whitley MD;  Location: [facility]     ORTHOPEDIC SURGERY      left labral tear repair       FAMILY HISTORY:   Family History   Problem Relation Age of Onset     Hypertension Mother      Hip dysplasia Mother      Coronary Artery Disease Father 50     Hyperlipidemia Father      Prostate Cancer Father 70     Other Cancer Father      Lung Cancer Father 80        non smoker     Celiac Disease Sister      Osteogenesis imperfecta Brother      Substance Abuse Maternal Grandmother      Cerebrovascular Disease Paternal Grandmother        SOCIAL HISTORY:   Social History     Tobacco Use     Smoking status: Never Smoker     Smokeless tobacco: Never Used   Substance Use Topics     Alcohol use: Yes     Comment: 2-3x week       REVIEW OF SYSTEMS: Review Of Systems  Skin: negative  Eyes: negative  Ears/Nose/Throat: hearing loss  Respiratory: No shortness of breath, dyspnea on exertion, cough, or hemoptysis  Cardiovascular: negative  Gastrointestinal: Indigestion/heartburn  Genitourinary: negative  Musculoskeletal: Neck pain, foot pain, lower extremity pain, weakness, leg swelling  Neurologic: Left-sided weakness secondary to tumor, seizures secondary to tumor  Psychiatric: negative  Hematologic/Lymphatic/Immunologic: negative  Endocrine: negative      Vital signs:  There were no vitals taken for this visit.    Current Outpatient Medications   Medication Sig Dispense Refill     Calcium Carbonate Antacid (TUMS PO) Take 1 tablet by mouth as needed       fluticasone (VERAMYST) 27.5 MCG/SPRAY spray Spray 2 sprays into both nostrils daily as needed for rhinitis or allergies       lamoTRIgine (LAMICTAL) 100 MG tablet Take 200 mg by mouth 2 times daily       levETIRAcetam (KEPPRA) 500 MG tablet Take 250 mg by  mouth 2 times daily       pantoprazole (PROTONIX) 40 MG EC tablet Take 1 tablet (40 mg) by mouth 2 times daily 180 tablet 3     tadalafil (CIALIS) 5 MG tablet Take 1 tablet (5 mg) by mouth daily 90 tablet 11       PHYSICAL EXAM:  General: Pleasant, fit, NAD.   HEENT: Normocephalic, atraumatic, external ears and nose normal.   Respiratory: Normal respiratory effort.   Cardiovascular: Pulse is regular.   Musculoskeletal: Gait and station normal.  The joints of his fingers and toes without deformity.  Some deformity of his knee joints bilaterally with a bony protuberance in the left infrapatellar area.  There is no cyanosis of his nailbeds.   EXTREMITIES: Right lower extremity: 3 to 4 mm varicosities scattered over the medial right calf.  8 mm protuberance of the distal third of the medial right leg consistent with an incompetent perforating vein.  No stasis changes or edema.    Left lower extremity: Palpable vein coursing down the medial left thigh.  3 to 4 mm varicosities about the left posterior medial calf.  The great saphenous vein is palpable coursing through this area.  Fascial defect on the left medial distal leg consistent with  disease.  No stasis changes.  Trace edema of the left ankle..    PULSES: R/L (3=normal pulse, 0=no palpable pulse) dorsalis pedis: 3/3; posterior tibial: 3/3.      Neurologic: Grossly normal  Psychiatric: Mood, affect, judgment and insight are normal     ASSESSMENT:  Mildly symptomatic left lower extremity varicose veins with virtually asymptomatic right lower extremity varicose veins.  These do not appear to be interfering with his actives of daily living and he has suffered no significant complications from them.    The anatomy lower extremity veins, the pathophysiology of venous insufficiency and the option of continued conservative management with small risk of superficial thrombophlebitis, bleeding and progression of disease process were discussed.    Briefly discussed  treatment options for superficial venous insufficiency with endovenous ablation in the office setting.  Management of branch tributaries with either phlebectomy or sclerotherapy were discussed.  After lengthy discussion, he feels that observation is reasonable option for him at this time.    PLAN:  Conservative measures with continued exercise, leg elevation, dietary measures and compression hose.  He will return on an as-needed basis.     Raúl Celaya MD    Dictated using Dragon voice recognition software which may result in transcription errors          [facility] LEG DRAWING:              Again, thank you for allowing me to participate in the care of your patient.        Sincerely,        Raúl Celaya MD

## 2022-09-06 ENCOUNTER — TELEPHONE (OUTPATIENT)
Dept: OTOLARYNGOLOGY | Facility: CLINIC | Age: 57
End: 2022-09-06

## 2022-09-06 NOTE — TELEPHONE ENCOUNTER
Called the pt and left a detailed msg that we do want the testing he has scheduled with audiology before his appt with Dr. Nissen. Per the diagnosis of Vestibular neuronitis, we require the testing. CB number if there is further questions.    Petty Campoverde LPN

## 2022-09-06 NOTE — TELEPHONE ENCOUNTER
[facility] Call Center    Phone Message    May a detailed message be left on voicemail: yes     Reason for Call: Other: Pt calling in to cancel balance testing , did not want to r/s but would like to keep ENT appts . States that he is not having any balance issues . Please advise if balance testing is needed and if pt is scheduled correctly . Then reach out to pt to discuss. thank you     Action Taken: Other: csc ent    Travel Screening: Not Applicable

## 2022-09-08 ENCOUNTER — TELEPHONE (OUTPATIENT)
Dept: AUDIOLOGY | Facility: CLINIC | Age: 57
End: 2022-09-08

## 2022-09-08 NOTE — TELEPHONE ENCOUNTER
"LVM that patient needs physical therapy eval prior to Dr. Nissen otherwise we cannot see them.       Also calling patient to remind them of balance testing next week    \"I m calling from the Audiology and Balance Testing department at the . This is just a call to remind you of your upcoming Balance Testing appointment on [Date], and to see if you have any questions or concerns regarding the balance testing you'll be doing. You should have received an itinerary via mail or via [employer], if you are active, that goes over what to expect and explains the dos and don ts both 48 hours before, and the day of. There is a list of medications for you to review on the itinerary that we would like you to stop taking beforehand. If you didn t receive the itinerary or you still have questions, please give our clinic a call at [phone]. Otherwise, we will see you on [Date] starting at [Time].\"    Please send encounter if patient would like to reschedule.        Gave call center numbers  "

## 2022-09-20 ENCOUNTER — PRE VISIT (OUTPATIENT)
Dept: OTOLARYNGOLOGY | Facility: CLINIC | Age: 57
End: 2022-09-20

## 2022-10-29 ENCOUNTER — HEALTH MAINTENANCE LETTER (OUTPATIENT)
Age: 57
End: 2022-10-29

## 2023-02-28 ENCOUNTER — NURSE TRIAGE (OUTPATIENT)
Dept: FAMILY MEDICINE | Facility: CLINIC | Age: 58
End: 2023-02-28

## 2023-02-28 ENCOUNTER — VIRTUAL VISIT (OUTPATIENT)
Dept: URGENT CARE | Facility: CLINIC | Age: 58
End: 2023-02-28
Payer: COMMERCIAL

## 2023-02-28 DIAGNOSIS — G40.909 SEIZURE DISORDER (H): ICD-10-CM

## 2023-02-28 DIAGNOSIS — U07.1 SARS-COV-2 POSITIVE: Primary | ICD-10-CM

## 2023-02-28 PROBLEM — Z83.79 FAMILY HISTORY OF CELIAC DISEASE: Status: ACTIVE | Noted: 2021-02-23

## 2023-02-28 PROCEDURE — 99213 OFFICE O/P EST LOW 20 MIN: CPT | Mod: CS

## 2023-02-28 RX ORDER — CALCIUM CARBONATE 500(1250)
500 TABLET,CHEWABLE ORAL
COMMUNITY
End: 2023-08-22

## 2023-02-28 NOTE — PROGRESS NOTES
"Assessment:    SARS-CoV-2 positive    Seizure disorder (H)    COVID-19 positive patient.  Encounter for consideration of medication intervention.    Patient does qualify for a prescription.   Full discussion with patient including medication options, risks and benefits.   Potential drug interactions reviewed with patient.      Plan:  Treatment planned   Paxlovid will be sent in to preferred pharmacy.     Temporary change to home medications:   HOLD Tadalafil    Joselin Chavez  is a 57 year old  who has a confirmed new positive COVID-19 diagnosis.      He has been identified as high risk for complications of this infection, and is being evaluated via a billable     Phone visit.      Phone call duration: 8 minutes  Patient location: Home  Provider location: Clinic    Concern for COVID-19  When did symptoms begin? 2/26/23    Positive COVID test? Yes    Symptoms (Cough, trouble breathing, fever, chills, headache, sore throat, chest pain, diarrhea, body aches)? Head ache, cough, chills, muscle and body aches, congestion           Constitutional, HEENT, cardiovascular, pulmonary, gi and gu systems are negative, except as otherwise noted.    Objective    Vitals:  No vitals were obtained today due to virtual visit.  Estimated body mass index is 23.04 kg/m  as calculated from the following:    Height as of 7/22/22: 1.819 m (5' 11.6\").    Weight as of 7/22/22: 76.2 kg (168 lb).   General: Alert, no apparent distress  PSYCH: Alert; coherent speech, normal rate and volume, able to articulate logical thoughts, appropriate insight, no tangential thoughts, no hallucination or delusions.  Affect is appropriate  RESP: No cough, no audible wheezing, able to talk in full sentences  Remainder of exam unable to be completed due to telephone visit  GFR Estimate   Date Value Ref Range Status   06/27/2022 88 >60 mL/min/1.73m2 Final     Comment:     Effective December 21, 2021 eGFRcr in adults is calculated using the 2021 CKD-EPI " "creatinine equation which includes age and gender (Sheree cummins al., NEJM, DOI: 10.1056/XNMWtm3006030)   05/08/2018 91 >59 ml/min/1.73m2 Final                The patient has been notified of following:     \"This telephone visit will be conducted via a call between you and your physician/provider. We have found that certain health care needs can be provided without the need for a physical exam.  This service lets us provide the care you need with a short phone conversation.  If a prescription is necessary we can send it directly to your pharmacy.  If lab work is needed we can place an order for that and you can then stop by our lab to have the test done at a later time.    Telephone visits are billed at different rates depending on your insurance coverage. During this emergency period, for some insurers they may be billed the same as an in-person visit.  Please reach out to your insurance provider with any questions.    If during the course of the call the physician/provider feels a telephone visit is not appropriate, you will not be charged for this service.\"    Patient has given verbal consent for Telephone visit?  Yes    "

## 2023-02-28 NOTE — TELEPHONE ENCOUNTER
RN COVID TREATMENT VISIT  02/28/23      The patient has been triaged and does not require a higher level of care.    Scott Ocampo  57 year old  Current weight? 170    Has the patient been seen by a primary care provider at an [facility] or [facility] within the past two years? Yes.   Have you been in close proximity to/do you have a known exposure to a person with a confirmed case of influenza? No.     General treatment eligibility:  Date of positive COVID test (PCR or at home)? 2/27/2023    Are you or have you been hospitalized for this COVID-19 infection? No.   Have you received monoclonal antibodies or antiviral treatment for COVID-19 since this positive test? No.   Do you have any of the following conditions that place you at risk of being very sick from COVID-19?   - Age 50 years or older  Yes, patient has at least one high risk condition as noted above.     Current COVID symptoms:   - fever or chills  - cough  - fatigue  - muscle or body aches  - headache  - congestion or runny nose  - nausea or vomiting  Yes. Patient has at least one symptom as selected.     How many days since symptoms started? 5 days or less. Established patient, 12 years or older weighing at least 88.2 lbs, who has symptoms that started in the past 5 days, has not been hospitalized nor received treatment already, and is at risk for being very sick from COVID-19.     Treatment eligibility by RN:    Are you currently pregnant or nursing? No    Do you have a clinically significant hypersensitivity to nirmatrelvir or ritonavir, or toxic epidermal necrolysis (TEN) or Galvez-Speedy Syndrome? No    Do you have a history of hepatitis, any hepatic impairment on the Problem List (such as Child-Carlton Class C, cirrhosis, fatty liver disease, alcoholic liver disease), or was the last liver lab (hepatic panel, ALT, AST, ALK Phos, bilirubin) elevated in the past 6 months? No    Do you have any history of severe renal  "impairment (eGFR < 30mL/min)? No    Is patient eligible to continue?   Yes, patient meets all eligibility requirements for the RN COVID treatment (as denoted by all no responses above).     Current Outpatient Medications   Medication Sig Dispense Refill     Calcium Carbonate Antacid (TUMS PO) Take 1 tablet by mouth as needed       fluticasone (VERAMYST) 27.5 MCG/SPRAY spray Spray 2 sprays into both nostrils daily as needed for rhinitis or allergies       lamoTRIgine (LAMICTAL) 100 MG tablet Take 200 mg by mouth 2 times daily       levETIRAcetam (KEPPRA) 500 MG tablet Take 250 mg by mouth 2 times daily       pantoprazole (PROTONIX) 40 MG EC tablet Take 1 tablet (40 mg) by mouth 2 times daily 180 tablet 3     tadalafil (CIALIS) 5 MG tablet Take 1 tablet (5 mg) by mouth daily 90 tablet 11       Medications from List 1 of the standing order (on medications that exclude the use of Paxlovid) that patient is taking: NONE. Is patient taking Saundra's Wort? No  Is patient taking Saundra's Wort or any meds from List 1? No.   Medications from List 2 of the standing order (on meds that provider needs to adjust) that patient is taking:  Is patient on any of the meds from List 2? Yes. Patient will be scheduled or transferred to a  at the end of this call. Pt on daily tadalafil.  Carri Delcid, MILIND     Appointments in Next Year    Feb 28, 2023  8:00 PM  Covid Treatment Visit with  VIRTUAL CARE PROVIDER  [facility] ([facility]  ) [phone]          1. COVID-19 DIAGNOSIS: \"Who made your COVID-19 diagnosis?\" \"Was it confirmed by a positive lab test or self-test?\" If not diagnosed by a doctor (or NP/PA), ask \"Are there lots of cases (community spread) where you live?\" Note: See public health department website, if unsure.  Home test  2. COVID-19 EXPOSURE: \"Was there any known exposure to COVID before the symptoms began?\" [facility] Definition of close contact: within 6 " "feet (2 meters) for a total of 15 minutes or more over a 24-hour period.   Unknown, was on a trip recently  3. ONSET: \"When did the COVID-19 symptoms start?\"   Sunday evening  4. WORST SYMPTOM: \"What is your worst symptom?\" (e.g., cough, fever, shortness of breath, muscle aches)  Headache  5. COUGH: \"Do you have a cough?\" If Yes, ask: \"How bad is the cough?\"    Not severe, intermittent infrequent.   6. FEVER: \"Do you have a fever?\" If Yes, ask: \"What is your temperature, how was it measured, and when did it start?\"  Denies  7. RESPIRATORY STATUS: \"Describe your breathing?\" (e.g., shortness of breath, wheezing, unable to speak)   Denies    8. BETTER-SAME-WORSE: \"Are you getting better, staying the same or getting worse compared to yesterday?\"  If getting worse, ask, \"In what way?\"   Same  9. HIGH RISK DISEASE: \"Do you have any chronic medical problems?\" (e.g., asthma, heart or lung disease, weak immune system, obesity, etc.)  Brain tumor was previously treated   10. VACCINE: \"Have you had the COVID-19 vaccine?\" If Yes, ask: \"Which one, how many shots, when did you get it?\"  Yes  11. BOOSTER: \"Have you received your COVID-19 booster?\" If Yes, ask: \"Which one and when did you get it?\"   1 booster, no bivalent booster  12. PREGNANCY: \"Is there any chance you are pregnant?\" \"When was your last menstrual period?\"   n/a  13. OTHER SYMPTOMS: \"Do you have any other symptoms?\"  (e.g., chills, fatigue, headache, loss of smell or taste, muscle pain, sore throat)  Fatigue, aches, nausea. No vomiting or diarrhea  14. O2 SATURATION MONITOR:  \"Do you use an oxygen saturation monitor (pulse oximeter) at home?\" If Yes, ask \"What is your reading (oxygen level) today?\" \"What is your usual oxygen saturation reading?\" (e.g., 95%)  n/a    Reason for Disposition    [1] COVID-19 diagnosed by positive lab test (e.g., PCR, rapid self-test kit) AND [2] mild symptoms (e.g., cough, fever, others) AND [3] no complications or SOB    Additional " Information    Negative: SEVERE difficulty breathing (e.g., struggling for each breath, speaks in single words)    Negative: Difficult to awaken or acting confused (e.g., disoriented, slurred speech)    Negative: Bluish (or gray) lips or face now    Negative: Shock suspected (e.g., cold/pale/clammy skin, too weak to stand, low BP, rapid pulse)    Negative: Sounds like a life-threatening emergency to the triager    Negative: [1] Diagnosed or suspected COVID-19 AND [2] symptoms lasting 3 or more weeks    Negative: [1] COVID-19 exposure AND [2] no symptoms    Negative: COVID-19 vaccine reaction suspected (e.g., fever, headache, muscle aches) occurring 1 to 3 days after getting vaccine    Negative: COVID-19 vaccine, questions about    Negative: [1] Lives with someone known to have influenza (flu test positive) AND [2] flu-like symptoms (e.g., cough, runny nose, sore throat, SOB; with or without fever)    Negative: [1] Adult with possible COVID-19 symptoms AND [2] triager concerned about severity of symptoms or other causes    Negative: COVID-19 and breastfeeding, questions about    Negative: SEVERE or constant chest pain or pressure  (Exception: Mild central chest pain, present only when coughing.)    Negative: MODERATE difficulty breathing (e.g., speaks in phrases, SOB even at rest, pulse 100-120)    Negative: Headache and stiff neck (can't touch chin to chest)    Negative: Oxygen level (e.g., pulse oximetry) 90 percent or lower    Negative: Chest pain or pressure  (Exception: MILD central chest pain, present only when coughing)    Negative: Patient sounds very sick or weak to the triager    Negative: MILD difficulty breathing (e.g., minimal/no SOB at rest, SOB with walking, pulse <100)    Negative: Fever > 103 F (39.4 C)    Negative: [1] Fever > 101 F (38.3 C) AND [2] over 60 years of age    Negative: [1] Fever > 100.0 F (37.8 C) AND [2] bedridden (e.g., nursing home patient, CVA, chronic illness, recovering from  surgery)    Negative: [1] HIGH RISK for severe COVID complications (e.g., weak immune system, age > 64 years, obesity with BMI of 30 or higher, pregnant, chronic lung disease or other chronic medical condition) AND [2] COVID symptoms (e.g., cough, fever)  (Exceptions: Already seen by PCP and no new or worsening symptoms.)    Negative: [1] HIGH RISK patient AND [2] influenza is widespread in the community AND [3] ONE OR MORE respiratory symptoms: cough, sore throat, runny or stuffy nose    Negative: [1] HIGH RISK patient AND [2] influenza exposure within the last 7 days AND [3] ONE OR MORE respiratory symptoms: cough, sore throat, runny or stuffy nose    Negative: Oxygen level (e.g., pulse oximetry) 91 to 94 percent    Negative: [1] COVID-19 infection suspected by caller or triager AND [2] mild symptoms (cough, fever, or others) AND [3] negative COVID-19 rapid test    Negative: Fever present > 3 days (72 hours)    Negative: [1] Fever returns after gone for over 24 hours AND [2] symptoms worse or not improved    Negative: [1] Continuous (nonstop) coughing interferes with work or school AND [2] no improvement using cough treatment per Care Advice    Negative: Cough present > 3 weeks    Negative: [1] COVID-19 diagnosed by positive lab test (e.g., PCR, rapid self-test kit) AND [2] NO symptoms (e.g., cough, fever, others)    Protocols used: CORONAVIRUS (COVID-19) DIAGNOSED OR [id number]

## 2023-03-15 ENCOUNTER — LAB (OUTPATIENT)
Dept: LAB | Facility: CLINIC | Age: 58
End: 2023-03-15
Payer: COMMERCIAL

## 2023-03-15 DIAGNOSIS — R56.9 SEIZURE (H): Primary | ICD-10-CM

## 2023-03-15 PROCEDURE — 36415 COLL VENOUS BLD VENIPUNCTURE: CPT

## 2023-03-15 PROCEDURE — 80175 DRUG SCREEN QUAN LAMOTRIGINE: CPT

## 2023-03-16 LAB — LAMOTRIGINE SERPL-MCNC: 6.9 UG/ML

## 2023-03-20 ENCOUNTER — TELEPHONE (OUTPATIENT)
Dept: OTOLARYNGOLOGY | Facility: CLINIC | Age: 58
End: 2023-03-20
Payer: COMMERCIAL

## 2023-03-20 ENCOUNTER — MYC MEDICAL ADVICE (OUTPATIENT)
Dept: FAMILY MEDICINE | Facility: CLINIC | Age: 58
End: 2023-03-20
Payer: COMMERCIAL

## 2023-03-20 DIAGNOSIS — H81.20 VESTIBULAR NEURONITIS, UNSPECIFIED LATERALITY: ICD-10-CM

## 2023-03-20 DIAGNOSIS — H93.13 TINNITUS, BILATERAL: Primary | ICD-10-CM

## 2023-03-20 NOTE — TELEPHONE ENCOUNTER
To PCP      Patient wanting ENT for hearing loss.    Referral pended for your Review.     Nayeli Rushing RN on 3/20/2023 at 3:17 PM

## 2023-03-20 NOTE — TELEPHONE ENCOUNTER
[facility] Call Center    Phone Message    May a detailed message be left on voicemail: no     Reason for Call: Appointment Intake    Referring Provider Name: Suyapa Sinha MD  Diagnosis and/or Symptoms: H81.20 (ICD-10-CM) - Vestibular neuronitis, unspecified laterality   H93.13 (ICD-10-CM) - Tinnitus, bilateral    Sending to clinic per protocols    Action Taken: Other: ENT    Travel Screening: Not Applicable

## 2023-03-21 NOTE — TELEPHONE ENCOUNTER
LVM to schedule VNG, rotary chair, audio, PT and see Nissen. Gave direct number as I am the only one able to schedule

## 2023-03-27 ENCOUNTER — TELEPHONE (OUTPATIENT)
Dept: OTOLARYNGOLOGY | Facility: CLINIC | Age: 58
End: 2023-03-27
Payer: COMMERCIAL

## 2023-03-27 NOTE — TELEPHONE ENCOUNTER
To PCP    See mychart message.    Order for ENT cannot say Vestibular Neuritis.    New Referral pende for your Review.     Nayeli Rushing RN on 3/27/2023 at 5:26 PM

## 2023-04-10 NOTE — TELEPHONE ENCOUNTER
FUTURE VISIT INFORMATION      FUTURE VISIT INFORMATION:    Date: 5/30/23    Time: 1:50pm    Location: [facility] [place]  REFERRAL INFORMATION:    Referring provider:  Suyapa Sinha MD    Referring providers clinic:  [facility]    Reason for visit/diagnosis  Tinnitus, bilateral [H93.13] ref by Suyapa Sinha MD, recs in [facility]    RECORDS REQUESTED FROM:       Clinic name Comments Records Status Imaging Status   [facility]   3/20/23- mychart with Suyapa Sinha MD [facility]  4/10/23- MR Brain   9/7/22- MR Brain   3/17/22- MR Brain - PACS  1/4/22- MR Brain - PACS    Office visit:  9/7/22, 3/17/22- note with Andrea Vu M.D. [facility]  4/10/23-  Pending req    -PACS                             April 10, 2023 12:16 PM - Faxed a request to [facility] to push Image to [facility] PACS- Annemarie   April 25, 2023 11:55 AM - Faxed a 2nd request to [facility] to push Image to [facility] PACS- Annemarie   May 9, 2023 3:24 PM Called [facility] to push images to [facility] PACS- Annemarie   May 10, 2023 2:43 PM - Received image in pacs and attached it to patient- Annemarie

## 2023-05-30 ENCOUNTER — PRE VISIT (OUTPATIENT)
Dept: OTOLARYNGOLOGY | Facility: CLINIC | Age: 58
End: 2023-05-30

## 2023-05-30 ENCOUNTER — OFFICE VISIT (OUTPATIENT)
Dept: OTOLARYNGOLOGY | Facility: CLINIC | Age: 58
End: 2023-05-30
Attending: INTERNAL MEDICINE
Payer: COMMERCIAL

## 2023-05-30 ENCOUNTER — OFFICE VISIT (OUTPATIENT)
Dept: AUDIOLOGY | Facility: CLINIC | Age: 58
End: 2023-05-30
Attending: INTERNAL MEDICINE
Payer: COMMERCIAL

## 2023-05-30 VITALS
HEIGHT: 71 IN | HEART RATE: 54 BPM | OXYGEN SATURATION: 96 % | DIASTOLIC BLOOD PRESSURE: 77 MMHG | BODY MASS INDEX: 24.08 KG/M2 | TEMPERATURE: 98.1 F | SYSTOLIC BLOOD PRESSURE: 119 MMHG | WEIGHT: 172 LBS

## 2023-05-30 DIAGNOSIS — H61.22 IMPACTED CERUMEN OF LEFT EAR: Primary | ICD-10-CM

## 2023-05-30 DIAGNOSIS — H90.3 SENSORY HEARING LOSS, BILATERAL: Primary | ICD-10-CM

## 2023-05-30 DIAGNOSIS — H90.3 ASYMMETRICAL SENSORINEURAL HEARING LOSS: ICD-10-CM

## 2023-05-30 DIAGNOSIS — H93.13 TINNITUS, BILATERAL: ICD-10-CM

## 2023-05-30 PROCEDURE — 99214 OFFICE O/P EST MOD 30 MIN: CPT | Mod: 25 | Performed by: REGISTERED NURSE

## 2023-05-30 PROCEDURE — 92557 COMPREHENSIVE HEARING TEST: CPT | Performed by: AUDIOLOGIST

## 2023-05-30 PROCEDURE — 92565 STENGER TEST PURE TONE: CPT | Performed by: AUDIOLOGIST

## 2023-05-30 PROCEDURE — 69210 REMOVE IMPACTED EAR WAX UNI: CPT | Mod: LT | Performed by: REGISTERED NURSE

## 2023-05-30 PROCEDURE — 92550 TYMPANOMETRY & REFLEX THRESH: CPT | Performed by: AUDIOLOGIST

## 2023-05-30 ASSESSMENT — PAIN SCALES - GENERAL: PAINLEVEL: NO PAIN (0)

## 2023-05-30 NOTE — LETTER
Hearing Aid Medical Clearance    Scott Hardeep Terrence  May 30, 2023        This patient has received a medical examination and may be considered a suitable candidate for a hearing aid.         Provider:__________________________________________________    Sonal Logan DNP, APRN, CNP.

## 2023-05-30 NOTE — NURSING NOTE
"Chief Complaint   Patient presents with     Consult     Bilateral tinnitus      Blood pressure 119/77, pulse 54, temperature 98.1  F (36.7  C), height 1.803 m (5' 11\"), weight 78 kg (172 lb), SpO2 96 %.    Gavino Schneider LPN    "

## 2023-05-30 NOTE — PROGRESS NOTES
AUDIOLOGY REPORT    SUMMARY: Audiology visit completed. See audiogram for results.      RECOMMENDATIONS: Follow-up with ENT.      Caden Awan, CCC-A  Licensed Audiologist  [place] #[id number]

## 2023-05-30 NOTE — PROGRESS NOTES
Otolaryngology Clinic  May 30, 2023    Chief Complaint:   Right tinnitus and hearing loss       History of Present Illness:   Scott Ocampo is a 58 year old male who presents today for evaluation of right tinnitus and hearing loss. Patient has a history of oligodendroglioma s/p resection, radiation and chemotherapy. Reports a constant, non-bothersome tinnitus since craniotomy in 2016.     New symptoms of right, constant, loud tinnitus, hearing loss and episode of vertigo in March 2022. Seen by neurology, treated with valtrex and medrol dose pack which moderately improved symptoms. Vertigo eventually resolved but tinnitus has remained constant. MRI brain completed at that time did not show any tumor progression or IAC lesions.    Patient continues to have right tinnitus and hearing loss. Occasional bilateral ear pain. History of noise exposure hunting. Patient denies any otorrhea, facial numbness/weakness, history of frequent ear infections, or ear surgeries.     Past Medical History:  Past Medical History:   Diagnosis Date     DDD (degenerative disc disease), cervical      Family history of prostate cancer      FH: chemotherapy      Gastroesophageal reflux disease      Glioma (H)      S/P radiation therapy      Seasonal allergies      Seizures (H)      Tear of right acetabular labrum      Uncomplicated asthma     exercise induced     Varicose vein of leg        Past Surgical History:  Past Surgical History:   Procedure Laterality Date     BRAIN SURGERY  07/2016     HEAD & NECK SURGERY      partial brain tumor resection     HERNIORRHAPHY UMBILICAL N/A 5/15/2018    Procedure: HERNIORRHAPHY UMBILICAL;;  Surgeon: Blkae Whitley MD;  Location: [facility]     LAPAROSCOPIC HERNIORRHAPHY INGUINAL BILATERAL Bilateral 5/15/2018    Procedure: LAPAROSCOPIC HERNIORRHAPHY INGUINAL BILATERAL;  LAPAROSCOPIC  BILATERAL  INGUINAL HERNIA REPAIR WITH MESH, OPEN UMBILICAL HERNIA REPAIR WITH MESH;  Surgeon: Blake Whitley  MD Alex;  Location: [facility]     ORTHOPEDIC SURGERY      left labral tear repair       Medications:  Current Outpatient Medications   Medication Sig Dispense Refill     calcium carbonate 500 mg, elemental, 1250 (500 Ca) MG tablet chewable Take 500 mg by mouth       Calcium Carbonate Antacid (TUMS PO) Take 1 tablet by mouth as needed       fluticasone (VERAMYST) 27.5 MCG/SPRAY spray Spray 2 sprays into both nostrils daily as needed for rhinitis or allergies       fluticasone furoate 27.5 MCG/SPRAY nasal spray Spray 2 sprays in nostril       folic acid (FOLATE) 1 mg/mL SUSP oral suspension Take 1 lozenge by mouth       lamoTRIgine (LAMICTAL) 100 MG tablet Take 200 mg by mouth 2 times daily       levETIRAcetam (KEPPRA) 500 MG tablet Take 250 mg by mouth 2 times daily       pantoprazole (PROTONIX) 40 MG EC tablet Take 1 tablet (40 mg) by mouth 2 times daily 180 tablet 3     tadalafil (CIALIS) 5 MG tablet Take 1 tablet (5 mg) by mouth daily 90 tablet 11     UNABLE TO FIND Hydrochloric acid supplement (HCL): 2 tabs daily (not sure of dose)         Allergies:  Allergies   Allergen Reactions     Gramineae Pollens Other (See Comments)     sneezing        Social History:  Social History     Tobacco Use     Smoking status: Never     Smokeless tobacco: Never   Substance Use Topics     Alcohol use: Yes     Comment: 2-3x week     Drug use: No       ROS: 10 point ROS neg other than the symptoms noted above in the HPI.    Physical Exam:    There were no vitals taken for this visit.     Constitutional:  The patient was unaccompanied, well-groomed, and in no acute distress.     Neurologic: Alert and oriented x 3. Voice normal.   Psychiatric: The patient's affect was calm, cooperative, and appropriate.    Communication:  Normal; communicates verbally, normal voice quality.    Respiratory: Breathing comfortably without stridor or exertion of accessory muscles.   Ears: Pinnae and tragus non-tender.  EAC's and TM's were clear.       Otologic microscope exam:    Right ear was examined under the microscope. Exostoses in canal. Normal appearing TM, nicely aerated middle ear space.    Left ear was also examined under the microscope. Exostoses in canal with cerumen impaction. Cleaned with suction. Difficult to completely clean inferior canal due to obstruction of exostoses. Normal appearing TM, nicely aerated middle ear space.     Audiogram: 5/30/2023- data independently reviewed  Right ear: Normal steeply sloping to severe sensorineural hearing loss  Left ear: Normal to mild sensorineural hearing loss.  WR right: 88% at 75 dB left: 100% at 60 dB  Acoustic Reflexes: Present in right ipsi and left contra.  Tympanograms: type Ad right, type Ad left        Assessment and Plan:  1. Asymmetric sensorineural hearing loss  Patient with right tinnitus and asymmetric hearing loss. Based on history, suspect a sudden right sensorineural hearing loss in March 2022. MRI at that time was negative. Reviewed audiogram today which reveals an asymmetric severe high frequency hearing loss. This is likely the cause of patient's right-sided tinnitus. Reviewed other factors that can contribute to tinnitus including stress, anxiety, lack of sleep, and neck/muscle tension.     Explained to patient that there is no specific medication or procedure that can eliminate tinnitus, however, there are evidence-based management strategies that can be used to improve symptoms. Because hearing loss is a contributing factor, patient could consider hearing aids for management.  Discussed other management strategies with patient including tinnitus masking and cognitive behavioral approaches.      Patient would like to think about hearing aid consult at this time. If patient does not proceed with consult, recommend follow up in 1 year to monitor hearing.     2. Left cerumen impaction  Left cerumen impaction cleaned under microscopy today. Difficult to completely clean due to exostoses  that obstruct inferior canal and TM to allow for complete cleaning. Recommend repeat cleaning in 1 year.     Patient will follow up in 1 year with audiogram to monitor hearing and repeat ear cleaning.    Sonal Logan DNP, APRN, CNP  Otolaryngology  Head & Neck Surgery  [phone]    45 minutes spent by me on the date of the encounter doing chart review, history and exam, documentation and further activities per the note excluding time spent cleaning and examining ear under microscopy.

## 2023-05-30 NOTE — LETTER
5/30/2023       RE: Scott Ocampo  [address] [place] [address] [address]  [facility] [address]     Dear Colleague,    Thank you for referring your patient, Scott Ocampo, to the [facility] EAR NOSE [facility]IN[facility] [place] [facility]. Please see a copy of my visit note below.      Otolaryngology Clinic  May 30, 2023    Chief Complaint:   Right tinnitus and hearing loss       History of Present Illness:   Scott Ocampo is a 58 year old male who presents today for evaluation of right tinnitus and hearing loss. Patient has a history of oligodendroglioma s/p resection, radiation and chemotherapy. Reports a constant, non-bothersome tinnitus since craniotomy in 2016.     New symptoms of right, constant, loud tinnitus, hearing loss and episode of vertigo in March 2022. Seen by neurology, treated with valtrex and medrol dose pack which moderately improved symptoms. Vertigo eventually resolved but tinnitus has remained constant. MRI brain completed at that time did not show any tumor progression or IAC lesions.    Patient continues to have right tinnitus and hearing loss. Occasional bilateral ear pain. History of noise exposure hunting. Patient denies any otorrhea, facial numbness/weakness, history of frequent ear infections, or ear surgeries.     Past Medical History:  Past Medical History:   Diagnosis Date    DDD (degenerative disc disease), cervical     Family history of prostate cancer     FH: chemotherapy     Gastroesophageal reflux disease     Glioma (H)     S/P radiation therapy     Seasonal allergies     Seizures (H)     Tear of right acetabular labrum     Uncomplicated asthma     exercise induced    Varicose vein of leg        Past Surgical History:  Past Surgical History:   Procedure Laterality Date    BRAIN SURGERY  07/2016    HEAD & NECK SURGERY      partial brain tumor resection    HERNIORRHAPHY UMBILICAL N/A 5/15/2018    Procedure: HERNIORRHAPHY  UMBILICAL;;  Surgeon: Blake Whitley MD;  Location: [facility]    LAPAROSCOPIC HERNIORRHAPHY INGUINAL BILATERAL Bilateral 5/15/2018    Procedure: LAPAROSCOPIC HERNIORRHAPHY INGUINAL BILATERAL;  LAPAROSCOPIC  BILATERAL  INGUINAL HERNIA REPAIR WITH MESH, OPEN UMBILICAL HERNIA REPAIR WITH MESH;  Surgeon: Blake Whitley MD;  Location: [facility]    ORTHOPEDIC SURGERY      left labral tear repair       Medications:  Current Outpatient Medications   Medication Sig Dispense Refill    calcium carbonate 500 mg, elemental, 1250 (500 Ca) MG tablet chewable Take 500 mg by mouth      Calcium Carbonate Antacid (TUMS PO) Take 1 tablet by mouth as needed      fluticasone (VERAMYST) 27.5 MCG/SPRAY spray Spray 2 sprays into both nostrils daily as needed for rhinitis or allergies      fluticasone furoate 27.5 MCG/SPRAY nasal spray Spray 2 sprays in nostril      folic acid (FOLATE) 1 mg/mL SUSP oral suspension Take 1 lozenge by mouth      lamoTRIgine (LAMICTAL) 100 MG tablet Take 200 mg by mouth 2 times daily      levETIRAcetam (KEPPRA) 500 MG tablet Take 250 mg by mouth 2 times daily      pantoprazole (PROTONIX) 40 MG EC tablet Take 1 tablet (40 mg) by mouth 2 times daily 180 tablet 3    tadalafil (CIALIS) 5 MG tablet Take 1 tablet (5 mg) by mouth daily 90 tablet 11    UNABLE TO FIND Hydrochloric acid supplement (HCL): 2 tabs daily (not sure of dose)         Allergies:  Allergies   Allergen Reactions    Gramineae Pollens Other (See Comments)     sneezing        Social History:  Social History     Tobacco Use    Smoking status: Never    Smokeless tobacco: Never   Substance Use Topics    Alcohol use: Yes     Comment: 2-3x week    Drug use: No       ROS: 10 point ROS neg other than the symptoms noted above in the HPI.    Physical Exam:    There were no vitals taken for this visit.     Constitutional:  The patient was unaccompanied, well-groomed, and in no acute distress.     Neurologic: Alert and oriented x 3. Voice normal.    Psychiatric: The patient's affect was calm, cooperative, and appropriate.    Communication:  Normal; communicates verbally, normal voice quality.    Respiratory: Breathing comfortably without stridor or exertion of accessory muscles.   Ears: Pinnae and tragus non-tender.  EAC's and TM's were clear.      Otologic microscope exam:    Right ear was examined under the microscope. Exostoses in canal. Normal appearing TM, nicely aerated middle ear space.    Left ear was also examined under the microscope. Exostoses in canal with cerumen impaction. Cleaned with suction. Difficult to completely clean inferior canal due to obstruction of exostoses. Normal appearing TM, nicely aerated middle ear space.     Audiogram: 5/30/2023- data independently reviewed  Right ear: Normal steeply sloping to severe sensorineural hearing loss  Left ear: Normal to mild sensorineural hearing loss.  WR right: 88% at 75 dB left: 100% at 60 dB  Acoustic Reflexes: Present in right ipsi and left contra.  Tympanograms: type Ad right, type Ad left        Assessment and Plan:  1. Asymmetric sensorineural hearing loss  Patient with right tinnitus and asymmetric hearing loss. Based on history, suspect a sudden right sensorineural hearing loss in March 2022. MRI at that time was negative. Reviewed audiogram today which reveals an asymmetric severe high frequency hearing loss. This is likely the cause of patient's right-sided tinnitus. Reviewed other factors that can contribute to tinnitus including stress, anxiety, lack of sleep, and neck/muscle tension.     Explained to patient that there is no specific medication or procedure that can eliminate tinnitus, however, there are evidence-based management strategies that can be used to improve symptoms. Because hearing loss is a contributing factor, patient could consider hearing aids for management.  Discussed other management strategies with patient including tinnitus masking and cognitive behavioral  approaches.      Patient would like to think about hearing aid consult at this time. If patient does not proceed with consult, recommend follow up in 1 year to monitor hearing.     2. Left cerumen impaction  Left cerumen impaction cleaned under microscopy today. Difficult to completely clean due to exostoses that obstruct inferior canal and TM to allow for complete cleaning. Recommend repeat cleaning in 1 year.     Patient will follow up in 1 year with audiogram to monitor hearing and repeat ear cleaning.    Sonal Logan DNP, APRN, CNP  Otolaryngology  Head & Neck Surgery  [phone]    45 minutes spent by me on the date of the encounter doing chart review, history and exam, documentation and further activities per the note excluding time spent cleaning and examining ear under microscopy.

## 2023-06-13 ENCOUNTER — NURSE TRIAGE (OUTPATIENT)
Dept: NURSING | Facility: CLINIC | Age: 58
End: 2023-06-13
Payer: COMMERCIAL

## 2023-06-13 ENCOUNTER — TELEPHONE (OUTPATIENT)
Dept: OTOLARYNGOLOGY | Facility: CLINIC | Age: 58
End: 2023-06-13
Payer: COMMERCIAL

## 2023-06-13 DIAGNOSIS — Z82.49 FAMILY HISTORY OF CORONARY ARTERIOSCLEROSIS: Primary | ICD-10-CM

## 2023-06-13 PROCEDURE — 93000 ELECTROCARDIOGRAM COMPLETE: CPT

## 2023-06-13 NOTE — TELEPHONE ENCOUNTER
To PCP  FYI    Writer called patient regarding medication request. This medication was reported to us as historical and patient has call out to prescribing provider.     Writer advised to wait to here from prescribing provider and to call their triage if needed. Patient in agreement.     Nayeli Rushing RN on 6/13/2023 at 7:23 AM

## 2023-06-13 NOTE — TELEPHONE ENCOUNTER
"Called patient regarding PCP message below. He states he was able to fill through neurology but wants PCP to know that he is very thankful for her wanting to \"step up to fill a short supply.\"     He also states neurology wants an EKG done and they were wondering if PCP would order that?    Order pended for your Review.     Nayeli Rushing RN on 6/13/2023 at 5:12 PM    "

## 2023-06-13 NOTE — TELEPHONE ENCOUNTER
Suyapa Sinha MD  Cs Triage Im 8 hours ago (8:23 AM)     BK  Those medications are refilled by neurology   We can give a month if he wishes

## 2023-06-13 NOTE — TELEPHONE ENCOUNTER
LVM for patient with call back number and sent [employer]. message with hearing aid information.    Kaitlin Whitehead, [facility]  Licensed Audiologist  [place] License #[id number]

## 2023-06-13 NOTE — TELEPHONE ENCOUNTER
[facility] Call Center    Phone Message    May a detailed message be left on voicemail: yes     Reason for Call: Other: Pt said he recently had a visit with audiology and ENT - he was informed he should get hearing aids and was given the option of coming here to [facility] or going to [facility], he has some more questions on this and consequences of doing one over the other, please call him to discuss further, he said afternoons work best, thanks     Action Taken: Other: AUDIO    Travel Screening: Not Applicable

## 2023-06-13 NOTE — TELEPHONE ENCOUNTER
Nurse Triage SBAR    Is this a 2nd Level Triage? YES, LICENSED PRACTITIONER REVIEW IS REQUIRED    Situation:   Patient ran out of seizure medications.    Background:   Patient is usually seen at [facility] for his meds but he is concerned about getting his medications in a timely manner.    Assessment:   He has ran out of Keppra and Lamictal.  He did message his doctor at [facility] but he is seeing if Dr. Sinha will be able to get him a refill on his meds.    Protocol Recommended Disposition:   No disposition on file.    Recommendation:   Please advise.  Pt can be reached at  [phone]  If Dr. Sinha is willing to give refills, please send them to [facility] on [address], [place].    Routed to provider    Does the patient meet one of the following criteria for ADS visit consideration? 16+ years old, with an [facility] PCP     TIP  Providers, please consider if this condition is appropriate for management at one of our Acute and Diagnostic Services sites.     If patient is a good candidate, please use dotphrase <dot>triageresponse and select Refer to ADS to document.    Reason for Disposition    [1] Caller requesting NON-URGENT health information AND [2] PCP's office is the best resource    Additional Information    Negative: Requesting regular office appointment    Negative: RN needs further essential information from caller in order to complete triage    Protocols used: INFORMATION ONLY CALL - NO TRIAGE-A-

## 2023-08-22 ENCOUNTER — OFFICE VISIT (OUTPATIENT)
Dept: FAMILY MEDICINE | Facility: CLINIC | Age: 58
End: 2023-08-22
Payer: COMMERCIAL

## 2023-08-22 ENCOUNTER — OFFICE VISIT (OUTPATIENT)
Dept: AUDIOLOGY | Facility: CLINIC | Age: 58
End: 2023-08-22
Payer: COMMERCIAL

## 2023-08-22 VITALS
HEIGHT: 71 IN | DIASTOLIC BLOOD PRESSURE: 73 MMHG | TEMPERATURE: 98 F | BODY MASS INDEX: 23.14 KG/M2 | RESPIRATION RATE: 13 BRPM | HEART RATE: 52 BPM | SYSTOLIC BLOOD PRESSURE: 116 MMHG | WEIGHT: 165.3 LBS | OXYGEN SATURATION: 97 %

## 2023-08-22 DIAGNOSIS — H90.3 SENSORINEURAL HEARING LOSS, BILATERAL: Primary | ICD-10-CM

## 2023-08-22 DIAGNOSIS — G40.909 SEIZURE DISORDER (H): Primary | ICD-10-CM

## 2023-08-22 DIAGNOSIS — Z23 NEED FOR VACCINATION: ICD-10-CM

## 2023-08-22 PROCEDURE — 93000 ELECTROCARDIOGRAM COMPLETE: CPT | Performed by: PHYSICIAN ASSISTANT

## 2023-08-22 PROCEDURE — 92591 PR HEARING AID EXAM BINAURAL: CPT | Performed by: AUDIOLOGIST

## 2023-08-22 PROCEDURE — 99213 OFFICE O/P EST LOW 20 MIN: CPT | Mod: 25 | Performed by: PHYSICIAN ASSISTANT

## 2023-08-22 PROCEDURE — 90715 TDAP VACCINE 7 YRS/> IM: CPT | Performed by: PHYSICIAN ASSISTANT

## 2023-08-22 PROCEDURE — 90471 IMMUNIZATION ADMIN: CPT | Performed by: PHYSICIAN ASSISTANT

## 2023-08-22 RX ORDER — LAMOTRIGINE 100 MG/1
300 TABLET ORAL 2 TIMES DAILY
COMMUNITY
Start: 2023-08-22

## 2023-08-22 RX ORDER — FAMOTIDINE 20 MG/1
20 TABLET, FILM COATED ORAL 2 TIMES DAILY
COMMUNITY
Start: 2023-08-22

## 2023-08-22 ASSESSMENT — PAIN SCALES - GENERAL: PAINLEVEL: NO PAIN (0)

## 2023-08-22 NOTE — PROGRESS NOTES
AUDIOLOGY REPORT    SUBJECTIVE: Scott Ocampo is a 58 year old male who was seen in the Audiology Clinic at  [facility] on 8/22/2023 to discuss concerns with hearing and functional communication difficulties. The patient has been seen previously on 5/30/2023, and results revealed normal hearing sloping to severe sensorineural hearing loss for the right ear and normal hearing sloping to mild sensorineural hearing loss for the left ear. He was medically evaluated and determined to be cleared for ampliication by Sonal Logan CNP. Scott notes difficulty with communication in a variety of listening situations. He also reports right tinnitus that is bothersome at times.     OBJECTIVE: The patient is a hearing aid candidate. The patient would like to move forward with a hearing aid evaluation today. Therefore, the patient was presented with different options for amplification to help aid in communication. Discussed styles, levels of technology, monaural vs. binaural fitting (the patient is a very borderline hearing aid candidate for the left ear), tinnitus maskers, iPhone compatibility/apps, and rechargeable options.     The hearing aids mutually chosen were:  BILATERAL: ReSound Omnia 9 - R  COLOR: 74  BATTERY SIZE: Rechargeable  EARMOLD/TIPS: RIGHT: Medium power domes LEFT: Medium open dome  CANAL/ LENGTH: 2([employer])    ASSESSMENT: Reviewed purchase information and warranty information with the patient. The 45 day trial period was explained to the patient. The patient was given a copy of the [place] Department of Health consumer brochure on purchasing hearing instruments. Patient risk factors have been provided to the patient in writing prior to the sale of the hearing aid per FDA regulation. The risk factors are also available in the User Instructional Booklet to be presented on the day of the hearing aid fitting. Hearing aids ordered. Hearing aid evaluation  completed.    PLAN: Scott is scheduled to return in 3-4 weeks for a hearing aid fitting and programming. Purchase agreement will be completed on that date. Please contact this clinic with any questions or concerns.      Caden Woodward, [facility]  Licensed Audiologist  [place] #[id number]

## 2023-08-22 NOTE — PROGRESS NOTES
Subjective   EDSON is a 58 year old, presenting for the following health issues:  Heart Problem    Pt needs baseline EKG prior to starting a new seizure medication  His neurologist is at [place]  He continues to have seizures twice per week despite taking his medications (lamictal and keppra) regularly  Rarely misses a dose  His seizures are sensory so he is able to drive      Past Medical History:   Diagnosis Date    DDD (degenerative disc disease), cervical     Family history of prostate cancer     FH: chemotherapy     Gastroesophageal reflux disease     Glioma (H)     S/P radiation therapy     Seasonal allergies     Seizures (H)     Tear of right acetabular labrum     Uncomplicated asthma     exercise induced    Varicose vein of leg      Past Surgical History:   Procedure Laterality Date    BRAIN SURGERY  07/2016    HEAD & NECK SURGERY      partial brain tumor resection    HERNIORRHAPHY UMBILICAL N/A 5/15/2018    Procedure: HERNIORRHAPHY UMBILICAL;;  Surgeon: Blake Whitley MD;  Location: [facility]    LAPAROSCOPIC HERNIORRHAPHY INGUINAL BILATERAL Bilateral 5/15/2018    Procedure: LAPAROSCOPIC HERNIORRHAPHY INGUINAL BILATERAL;  LAPAROSCOPIC  BILATERAL  INGUINAL HERNIA REPAIR WITH MESH, OPEN UMBILICAL HERNIA REPAIR WITH MESH;  Surgeon: Blake Whitley MD;  Location: [facility]    ORTHOPEDIC SURGERY      left labral tear repair     Social History     Tobacco Use    Smoking status: Never    Smokeless tobacco: Never   Substance Use Topics    Alcohol use: Yes     Comment: 2-3x week     Current Outpatient Medications   Medication Sig Dispense Refill    Calcium Carbonate Antacid (TUMS PO) Take 1 tablet by mouth as needed      famotidine (PEPCID) 20 MG tablet Take 1 tablet (20 mg) by mouth 2 times daily      fluticasone (VERAMYST) 27.5 MCG/SPRAY spray Spray 2 sprays into both nostrils daily as needed for rhinitis or allergies      fluticasone furoate 27.5 MCG/SPRAY nasal spray Spray 2 sprays in nostril       "lamoTRIgine (LAMICTAL) 100 MG tablet Take 3 tablets (300 mg) by mouth 2 times daily      levETIRAcetam (KEPPRA) 500 MG tablet Take 250 mg by mouth 2 times daily      tadalafil (CIALIS) 5 MG tablet Take 1 tablet (5 mg) by mouth daily 90 tablet 11     Allergies   Allergen Reactions    Gramineae Pollens Other (See Comments)     sneezing       PHYSICAL EXAM:    /73 (BP Location: Right arm, Patient Position: Sitting, Cuff Size: Adult Regular)   Pulse 52   Temp 98  F (36.7  C) (Tympanic)   Resp 13   Ht 1.803 m (5' 11\")   Wt 75 kg (165 lb 4.8 oz)   SpO2 97%   BMI 23.05 kg/m      Patient appears non toxic  Heart: bradycardic, reg rhythm, no murmur     EKG: sinus cholo    Assessment and Plan:     (G40.909) Seizure disorder (H)  (primary encounter diagnosis)  Comment:   Plan: EKG 12-lead complete w/read - Clinics        Baseline EKG obtained and copy given to pt to bring to his neurology appt as requested.    (Z23) Need for vaccination  Comment:   Plan: Tdap updated today      Perla Lee PA-C      "

## 2023-08-22 NOTE — NURSING NOTE
Prior to immunization administration, verified patients identity using patient s name and date of birth. Please see Immunization Activity for additional information.     Screening Questionnaire for Adult Immunization    Are you sick today?   No   Do you have allergies to medications, food, a vaccine component or latex?   No   Have you ever had a serious reaction after receiving a vaccination?   No   Do you have a long-term health problem with heart, lung, kidney, or metabolic disease (e.g., diabetes), asthma, a blood disorder, no spleen, complement component deficiency, a cochlear implant, or a spinal fluid leak?  Are you on long-term aspirin therapy?   No   Do you have cancer, leukemia, HIV/AIDS, or any other immune system problem?   Yes   Do you have a parent, brother, or sister with an immune system problem?   No   In the past 3 months, have you taken medications that affect  your immune system, such as prednisone, other steroids, or anticancer drugs; drugs for the treatment of rheumatoid arthritis, Crohn s disease, or psoriasis; or have you had radiation treatments?   No   Have you had a seizure, or a brain or other nervous system problem?   Yes   During the past year, have you received a transfusion of blood or blood    products, or been given immune (gamma) globulin or antiviral drug?   No   For women: Are you pregnant or is there a chance you could become       pregnant during the next month?   No   Have you received any vaccinations in the past 4 weeks?   No     Immunization questionnaire was positive for at least one answer.  Notified Perla Lee PA-C.      Patient instructed to remain in clinic for 15 minutes afterwards, and to report any adverse reactions.     Screening performed by Tasneem Santos on 8/22/2023 at 3:05 PM.

## 2023-09-02 ENCOUNTER — HEALTH MAINTENANCE LETTER (OUTPATIENT)
Age: 58
End: 2023-09-02

## 2023-09-19 ENCOUNTER — OFFICE VISIT (OUTPATIENT)
Dept: AUDIOLOGY | Facility: CLINIC | Age: 58
End: 2023-09-19
Payer: COMMERCIAL

## 2023-09-19 DIAGNOSIS — H90.3 SENSORINEURAL HEARING LOSS, BILATERAL: Primary | ICD-10-CM

## 2023-09-19 PROCEDURE — V5261 HEARING AID, DIGIT, BIN, BTE: HCPCS | Performed by: AUDIOLOGIST

## 2023-09-19 PROCEDURE — V5020 CONFORMITY EVALUATION: HCPCS | Performed by: AUDIOLOGIST

## 2023-09-19 PROCEDURE — V5160 DISPENSING FEE BINAURAL: HCPCS | Performed by: AUDIOLOGIST

## 2023-09-19 PROCEDURE — V5011 HEARING AID FITTING/CHECKING: HCPCS | Performed by: AUDIOLOGIST

## 2023-09-19 NOTE — PROGRESS NOTES
AUDIOLOGY REPORT  SUBJECTIVE: Scott Ocampo is a 58 year old male who was seen in the Audiology Clinic at [facility] for a fitting of bilateral ReSound Omnia 9 - R hearing aids. Previous results have revealed normal hearing sloping to severe sensorineural hearing loss for the right ear and normal hearing sloping to mild sensorineural hearing loss for the left ear. He was medically evaluated and determined to be cleared for ampliication by Sonal Logan CNP.     OBJECTIVE: The hearing aid conformity evaluation was completed. The hearing aids were placed and provided a good fit. Simulated real-ear measurements were completed on the afterBOT system and were a good match to NAL-NL1 targets with soft sounds audible, moderate sounds comfortable, and loud sounds below discomfort. UCLs are verified through maximum power output measures and demonstrate appropriate limiting of loud inputs. Scott was oriented to proper hearing aid use, care, cleaning (no water, dry brush), batteries (size: rechargeable, low-battery signal), aid insertion/removal, user booklet, warranty information, storage cases, and other hearing aid details. The patient confirmed understanding of hearing aid use and care and showed proper insertion of the hearing aids while in the office today. There was some trouble with feedback with an open dome for the left hearing aid but the patient reported not as natural of sound quality with more occlusion. Reviewed how he is a very borderline candidate for the left ear. Recommended he try wearing both hearing aids for 1-2 weeks and then just the left hearing aid for 1-2 weeks to make a decision.   Hearing aids were programmed as follows:  Program 1: All-Around  Program button and volume control were deactivated today. The hearing aids were paired to the patient's iPhone and the eTobb augie. Use of both were reviewed.     EARS FIT: Bilateral  HEARING AID MODEL NAME:  ReSound Omnia 9 - R  HEARING AID STYLE: -in-the-ear behind-the-ear  EARMOLDS/TIP: Right: Small power dome Left: Medium open dome  SERIAL NUMBERS: Right: [id number] Left: [id number]  WARRANTY END DATE: 9/26/2027    ASSESSMENT: Bilateral ReSound Omnia 9 - R hearing aids were fit today. Verification measures were performed. Scott signed the Hearing Aid Purchase Agreement and was given a copy, as well as details on his hearing aids. The patient was counseled that exact out of pocket amounts cannot be determined for hearing aid claims being sent to insurance. Any insurance coverage information presented to the patient is an estimate only, and is not a guarantee of payment. The patient has been advised to check with their own insurance.    PLAN: Scott will return for follow-up in 2-3 weeks for a hearing aid review appointment. He will decide if he would like to keep both hearings aids or just the right. Will also discuss if he would like to add a tinnitus masker for the right. Please call this clinic with questions regarding today s appointment.    Caden Woodward, [facility]  Licensed Audiologist  [place] #[id number]

## 2023-10-02 NOTE — TELEPHONE ENCOUNTER
"Patient has video visit scheduled for 9/27 re: \"discolored sperm\"   Patient requesting PSA and semen analysis prior to appointment. Okay to order?     Thank you,   Taz HERZOG RN   Specialty Clinics   " ----- Message from Hugh Murguia sent at 10/2/2023  1:58 PM EDT -----  Subject: Message to Provider    QUESTIONS  Information for Provider? Patient would like if clinical staff could   callback with questions and concerns she have  ---------------------------------------------------------------------------  --------------  [address]  [phone]; OK to leave message on voicemail  ---------------------------------------------------------------------------  --------------  SCRIPT ANSWERS  Relationship to Patient?  Self

## 2023-10-11 ENCOUNTER — OFFICE VISIT (OUTPATIENT)
Dept: AUDIOLOGY | Facility: CLINIC | Age: 58
End: 2023-10-11

## 2023-10-11 DIAGNOSIS — H90.3 SENSORINEURAL HEARING LOSS, BILATERAL: Primary | ICD-10-CM

## 2023-10-11 PROCEDURE — 99207 PR ASSESSMENT FOR HEARING AID: CPT | Performed by: AUDIOLOGIST

## 2023-10-11 NOTE — PROGRESS NOTES
AUDIOLOGY REPORT    SUBJECTIVE: Scott Ocampo is a 58 year old male who was seen in the Audiology Clinic at [facility] on 10/11/2023 for a follow-up check regarding the fitting of new hearing aids. Previous results have revealed normal hearing sloping to severe sensorineural hearing loss for the right ear and normal hearing sloping to mild sensorineural hearing loss for the left ear. The patient has been seen previously in this clinic and was fit with bilateral ReSound Omnia 9 - R hearing aids on 9/19/2023. He reports he does not like the left hearing aid and would like to return it. He finds the right hearing aid uncomfortable. He is unsure if he has seen enough benefit to make it worth it. He would like to try the tinnitus masker. He would also like to add a Restaurant program.      OBJECTIVE: Datalogging showed an average of 4 hours of use per day. Tried multiple smaller dome sizes for the right hearing aid. A tulip dome was the smallest/most comfortable size for the patient that did not create feedback or cut gain in the high frequencies. He preferred to have the /dome pulled slightly out of the ear, as he found it more comfortable. Reviewed that the /dome needs to be pushed fully into the ear to receive maximum benefit from the hearing aid. A second program was created with the tinnitus masker activated. A Restaurant program was added. Encouraged the patient to wear the right hearing aid for at least 8-10 hours per day.      Reviewed 45 day trial period, care, cleaning (no water, dry brush), batteries/ (size: rechargeable, low-battery signal), aid insertion/removal, volume adjustment (if applicable), user booklet, warranty information, storage cases, and other hearing aid details.     ASSESSMENT: A follow-up appointment for hearing aid fitting was completed today. Changes made as outlined above. The left ReSound Omnia 9 - R hearing aid was  returned for credit. No charge visit, as the hearing aid is in warranty.     PLAN: Scott will return in 2 weeks to check in on the changes made today. Please call this clinic with any questions regarding today s appointment.      Caden Woodward, [facility]  Licensed Audiologist  [place] #[id number]

## 2023-10-26 ENCOUNTER — OFFICE VISIT (OUTPATIENT)
Dept: AUDIOLOGY | Facility: CLINIC | Age: 58
End: 2023-10-26

## 2023-10-26 DIAGNOSIS — H90.3 SENSORINEURAL HEARING LOSS, BILATERAL: Primary | ICD-10-CM

## 2023-10-26 PROCEDURE — 99207 PR ASSESSMENT FOR HEARING AID: CPT | Performed by: AUDIOLOGIST

## 2023-10-26 NOTE — PROGRESS NOTES
AUDIOLOGY REPORT    SUBJECTIVE: Scott Ocampo is a 58 year old male who was seen in the Audiology Clinic at [facility] on 10/26/2023 for a follow-up check regarding the fitting of new hearing aids. Previous results have revealed normal hearing sloping to severe sensorineural hearing loss for the right ear and normal hearing sloping to mild sensorineural hearing loss for the left ear. The patient has been seen previously in this clinic and was fit with bilateral ReSound Omnia 9 - R hearing aids on 9/19/2023. He returned the left hearing aid at his last visit on 10/11/2023. He tried some changes to the right hearing. He notes he saw benefit from the tinnitus masker, but did not find the hearing aid helpful enough to want to keep it.      OBJECTIVE: Discussed room-level masking options as well as phone apps that the patient could try. The right ReSound Omnia 9 - R hearing aid was returned.     ASSESSMENT: No charge visit, as no billable procedures were performed.      PLAN: Scott will return for updated audiologic evaluation in the future if changes are noted. Please call this clinic with any questions regarding today s appointment.      Caden Woodward, [facility]  Licensed Audiologist  [place] #[id number]

## 2023-10-29 ENCOUNTER — MYC MEDICAL ADVICE (OUTPATIENT)
Dept: FAMILY MEDICINE | Facility: CLINIC | Age: 58
End: 2023-10-29

## 2023-10-30 ENCOUNTER — NURSE TRIAGE (OUTPATIENT)
Dept: FAMILY MEDICINE | Facility: CLINIC | Age: 58
End: 2023-10-30

## 2023-10-30 DIAGNOSIS — R07.89 ATYPICAL CHEST PAIN: Primary | ICD-10-CM

## 2023-10-30 PROCEDURE — 99207 REFERRAL TO ACUTE AND DIAGNOSTIC SERVICES: CPT | Performed by: INTERNAL MEDICINE

## 2023-10-30 NOTE — TELEPHONE ENCOUNTER
Ernesto Chavez,     Thank you for reaching out regarding your health care concern.   Please call [phone] and choose option #2 to speak to a triage nurse about your symptoms so we can best direct your care.     Thank you,  [facility] RN Triage Team     This [employer] message has not been read.     EDSON WILLINGHAM  Triage Im (supporting Suyapa Sinha MD)13 hours ago (10:59 PM)     Ernesto Sinha:   I don't think this is serious but want to mention I am occasionally suffering Shortness of breath with no explanation.  I also have postural hypotension (nothing new) but a couple of weeks ago it was bad enough for me to fall slowly to the ground as I was satartibng to pass out.  I thought you should know.  When my Dad was younger than me and very active.  He was assymptomatic when he had a stress test.  He ended up with a triple artery bypass.  .... so I'm extra mindful.  You got me a calcuum scan with a great result, but I know there is no assurance with that.     Do you think we should do anything?

## 2023-10-30 NOTE — TELEPHONE ENCOUNTER
"Nurse Triage SBAR    Is this a 2nd Level Triage? YES, LICENSED PRACTITIONER REVIEW IS REQUIRED    Situation:     Writer called patient to triage symptoms reported in recent [employer] message     Background:     Patient reporting intermittent chest pain/SOB for the last 6-8 weeks     Assessment:     Breathing Difficulty:     RESPIRATORY STATUS: SOB - patient states \"it feels like I need to sigh\"   ONSET: 6-8 weeks ago   PATTERN: comes and goes - with activity and at rest   SEVERITY: moderate - happens at rest   RECURRENT SYMPTOM: denies   CARDIAC HISTORY: denies heart attack, angina, bypass surgery, angioplasty. Reports father having history of bypass   LUNG HISTORY: denies hx of , pulmonary embolus, emphysema. Reports hx of exercise induced asthma - was not severe   CAUSE: unsure   OTHER SYMPTOMS: does report chest pain (see below) - patient also states he has postural hypotension and almost \"passed out\" a couple of weeks ago   O2 SATURATION MONITOR: low 90s - uses apple watch - unable to get O2 saturation right now     Patient also reports \"My heart might be feeling a little faster, but I don't feel palpitations\"     Reports dizziness but only when going from crouching to standing     Patient also states \"sometimes I realize that I am holding my breath\"     Also reports chest pain with exertion     Chest Pain:     LOCATION: central   RADIATION: denies radiation   ONSET: 6-8 weeks ago   PATTERN: with exertion   DURATION: 30 seconds at a time   SEVERITY: 4/10  CAUSE: unsure     Patient states he used to have similar symptoms while running but then stretching would resolve symptoms. States that does not work with current symptoms.     Protocol Recommended Disposition:      Go to ED Now     Recommendation:     Patient denies chest pain currently but does feel SOB while on phone     Triaged per Epic protocol, patient to go to ED now. Advised patient seek ED due to new onset intermittent exertional chest pain, breathing " "difficulty, low O2 readings etc.     Patient declines and states \"I have a pretty good sense of what is going on with my body, I don't feel like it is high acuity, I just feel like I need to sigh\" . Patient states he would be comfortable with waiting for an office visit instead of going to emergency room and would like a message to be sent to PCP to advise.    Routing to Dr. Sinha to please review and advise - thank you!     Callback [phone] - ok to leave detailed VM     Dicussed if any worsening symptoms prior to callback to go to ED, patient expressed verbal understanding and is agreeable to do so.     Wesley Casarez RN  [facility]    Reason for Disposition   Chest pain   Chest pain(s) lasting a few seconds persists > 3 days   MODERATE difficulty breathing (e.g., speaks in phrases, SOB even at rest, pulse 100-120) of new-onset or worse than normal    Additional Information   Negative: SEVERE difficulty breathing (e.g., struggling for each breath, speaks in single words, pulse > 120)   Negative: Breathing stopped and hasn't returned   Negative: Choking on something   Negative: Bluish (or gray) lips or face   Negative: Difficult to awaken or acting confused (e.g., disoriented, slurred speech)   Negative: Passed out (i.e., fainted, collapsed and was not responding)   Negative: Wheezing started suddenly after medicine, an allergic food, or bee sting   Negative: Stridor (harsh sound while breathing in)   Negative: Slow, shallow and weak breathing   Negative: Sounds like a life-threatening emergency to the triager   Negative: SEVERE difficulty breathing (e.g., struggling for each breath, speaks in single words)   Negative: Difficult to awaken or acting confused (e.g., disoriented, slurred speech)   Negative: Shock suspected (e.g., cold/pale/clammy skin, too weak to stand, low BP, rapid pulse)   Negative: Passed out (i.e., lost consciousness, collapsed and was not responding)   Negative: Chest " pain lasting longer than 5 minutes and ANY of the following:         Pain is crushing, pressure-like, or heavy         Over 44 years old          Over 30 years old and one cardiac risk factor (e.g diabetes, high blood pressure, high cholesterol, smoker, or family history of heart disease)         History of heart disease (e.g. angina, heart attack, heart failure, bypass surgery, takes nitroglycerin)   Negative: Heart beating < 50 beats per minute OR > 140 beats per minute   Negative: Visible sweat on face or sweat dripping down face   Negative: Sounds like a life-threatening emergency to the triager   Negative: Followed an injury to chest   Negative: SEVERE chest pain   Negative: Pain also in shoulder(s) or arm(s) or jaw   Negative: Difficulty breathing   Negative: Cocaine use within last 3 days   Negative: Major surgery in the past month   Negative: Hip or leg fracture (broken bone) in past month (or had cast on leg or ankle in past month)   Negative: Illness requiring prolonged bedrest in past month (e.g., immobilization, long hospital stay)   Negative: Long-distance travel in past month (e.g., car, bus, train, plane; with trip lasting 6 or more hours)   Negative: History of prior 'blood clot' in leg or lungs (i.e., deep vein thrombosis, pulmonary embolism)   Negative: History of inherited increased risk of blood clots (e.g., Factor 5 Leiden, Anti-thrombin 3, Protein C or Protein S deficiency, Prothrombin mutation)   Negative: Cancer treatment in the past two months (or has cancer now)   Negative: Heart beating irregularly or very rapidly   Negative: Chest pain lasting longer than 5 minutes and occurred in last 3 days (72 hours) (Exception: Feels exactly the same as previously diagnosed heartburn and has accompanying sour taste in mouth.)   Negative: Chest pain or 'angina' comes and goes and is happening more often (increasing in frequency) or getting worse (increasing in severity) (Exception: Chest pains that  last only a few seconds.)   Negative: Dizziness or lightheadedness   Negative: Coughing up blood   Negative: Patient sounds very sick or weak to the triager   Negative: Patient says chest pain feels exactly the same as previously diagnosed 'heartburn' and describes burning in chest and accompanying sour taste in mouth   Negative: Fever > 100.4 F (38.0 C)    Protocols used: Breathing Difficulty-A-[place], Chest Pain-A-[place]

## 2023-11-07 ENCOUNTER — CARE COORDINATION (OUTPATIENT)
Dept: CARDIOLOGY | Facility: CLINIC | Age: 58
End: 2023-11-07
Payer: COMMERCIAL

## 2023-11-07 ENCOUNTER — TELEPHONE (OUTPATIENT)
Dept: CARDIOLOGY | Facility: CLINIC | Age: 58
End: 2023-11-07
Payer: COMMERCIAL

## 2023-11-07 DIAGNOSIS — R07.9 CHEST PAIN: Primary | ICD-10-CM

## 2023-11-07 DIAGNOSIS — R06.09 DYSPNEA ON EXERTION: ICD-10-CM

## 2023-11-07 DIAGNOSIS — R06.02 SHORTNESS OF BREATH: Primary | ICD-10-CM

## 2023-11-07 DIAGNOSIS — I20.9 ANGINA PECTORIS, UNSPECIFIED (H): ICD-10-CM

## 2023-11-07 NOTE — PROGRESS NOTES
Received call with concern about new chest pain the last several weeks. There are typical and atypical features, but a strong family history of early CAD.    Ordered exercise stress echo. Patient understood and is in agreement with the plan and knows should the pain come on and persist to go to ED.

## 2023-11-07 NOTE — TELEPHONE ENCOUNTER
Received call from Sammy in the [facility] EKG and Stress lab stating they have an IP order for a stress echo, but that the pt has not been admitted.  Order placed by Dr Chun per note below.  New order placed for OP exercise stress echo, and message sent to scheduling requesting they call patient to schedule.      ADDENDUM 11/13/23  Diagnosis code added: angina pectoris 120.9 for insurance approval purposes.  Test is scheduled for 11/17/23.       November 7, 2023  Charlie Chun MD         11/7/23 12:00 PM  Note  Received call with concern about new chest pain the last several weeks. There are typical and atypical features, but a strong family history of early CAD.    Ordered exercise stress echo. Patient understood and is in agreement with the plan and knows should the pain come on and persist to go to ED.

## 2023-11-30 ENCOUNTER — HOSPITAL ENCOUNTER (OUTPATIENT)
Dept: CARDIOLOGY | Facility: CLINIC | Age: 58
Discharge: HOME | End: 2023-11-30
Attending: INTERNAL MEDICINE | Admitting: INTERNAL MEDICINE
Payer: COMMERCIAL

## 2023-11-30 DIAGNOSIS — R07.9 CHEST PAIN: ICD-10-CM

## 2023-11-30 DIAGNOSIS — I20.9 ANGINA PECTORIS, UNSPECIFIED (H): ICD-10-CM

## 2023-11-30 PROCEDURE — 93350 STRESS TTE ONLY: CPT | Mod: 26 | Performed by: INTERNAL MEDICINE

## 2023-11-30 PROCEDURE — 93321 DOPPLER ECHO F-UP/LMTD STD: CPT | Mod: 26 | Performed by: INTERNAL MEDICINE

## 2023-11-30 PROCEDURE — 93325 DOPPLER ECHO COLOR FLOW MAPG: CPT | Mod: 26 | Performed by: INTERNAL MEDICINE

## 2023-11-30 PROCEDURE — 93017 CV STRESS TEST TRACING ONLY: CPT

## 2023-11-30 PROCEDURE — 93018 CV STRESS TEST I&R ONLY: CPT | Performed by: INTERNAL MEDICINE

## 2023-11-30 PROCEDURE — 93016 CV STRESS TEST SUPVJ ONLY: CPT | Performed by: INTERNAL MEDICINE

## 2023-11-30 PROCEDURE — 93325 DOPPLER ECHO COLOR FLOW MAPG: CPT | Mod: TC

## 2024-01-02 ENCOUNTER — OFFICE VISIT (OUTPATIENT)
Dept: FAMILY MEDICINE | Facility: CLINIC | Age: 59
End: 2024-01-02
Payer: COMMERCIAL

## 2024-01-02 VITALS
BODY MASS INDEX: 21.4 KG/M2 | OXYGEN SATURATION: 97 % | DIASTOLIC BLOOD PRESSURE: 68 MMHG | TEMPERATURE: 97.3 F | HEIGHT: 72 IN | WEIGHT: 158 LBS | SYSTOLIC BLOOD PRESSURE: 101 MMHG | HEART RATE: 67 BPM

## 2024-01-02 DIAGNOSIS — Z12.11 SPECIAL SCREENING FOR MALIGNANT NEOPLASMS, COLON: ICD-10-CM

## 2024-01-02 DIAGNOSIS — C71.9 GLIOMA (H): ICD-10-CM

## 2024-01-02 DIAGNOSIS — G40.909 SEIZURE DISORDER (H): ICD-10-CM

## 2024-01-02 DIAGNOSIS — N13.8 BENIGN PROSTATIC HYPERPLASIA WITH URINARY OBSTRUCTION: ICD-10-CM

## 2024-01-02 DIAGNOSIS — S32.009D CLOSED FRACTURE OF TRANSVERSE PROCESS OF LUMBAR VERTEBRA WITH ROUTINE HEALING, SUBSEQUENT ENCOUNTER: ICD-10-CM

## 2024-01-02 DIAGNOSIS — K90.41 NON-CELIAC GLUTEN SENSITIVITY: ICD-10-CM

## 2024-01-02 DIAGNOSIS — Z23 ENCOUNTER FOR IMMUNIZATION: ICD-10-CM

## 2024-01-02 DIAGNOSIS — Z13.6 CARDIOVASCULAR SCREENING; LDL GOAL LESS THAN 100: ICD-10-CM

## 2024-01-02 DIAGNOSIS — Z12.5 SCREENING PSA (PROSTATE SPECIFIC ANTIGEN): ICD-10-CM

## 2024-01-02 DIAGNOSIS — Z23 NEED FOR PROPHYLACTIC VACCINATION AND INOCULATION AGAINST INFLUENZA: ICD-10-CM

## 2024-01-02 DIAGNOSIS — R53.82 CHRONIC FATIGUE: ICD-10-CM

## 2024-01-02 DIAGNOSIS — K21.00 GASTROESOPHAGEAL REFLUX DISEASE WITH ESOPHAGITIS WITHOUT HEMORRHAGE: ICD-10-CM

## 2024-01-02 DIAGNOSIS — R63.4 WEIGHT LOSS: ICD-10-CM

## 2024-01-02 DIAGNOSIS — Z00.00 ROUTINE GENERAL MEDICAL EXAMINATION AT A HEALTH CARE FACILITY: Primary | ICD-10-CM

## 2024-01-02 DIAGNOSIS — N40.1 BENIGN PROSTATIC HYPERPLASIA WITH URINARY OBSTRUCTION: ICD-10-CM

## 2024-01-02 LAB
ALBUMIN SERPL BCG-MCNC: 4.7 G/DL (ref 3.5–5.2)
ALP SERPL-CCNC: 51 U/L (ref 40–150)
ALT SERPL W P-5'-P-CCNC: 30 U/L (ref 0–70)
ANION GAP SERPL CALCULATED.3IONS-SCNC: 8 MMOL/L (ref 7–15)
AST SERPL W P-5'-P-CCNC: 27 U/L (ref 0–45)
BILIRUB SERPL-MCNC: 0.4 MG/DL
BUN SERPL-MCNC: 13 MG/DL (ref 6–20)
CALCIUM SERPL-MCNC: 9.7 MG/DL (ref 8.6–10)
CHLORIDE SERPL-SCNC: 102 MMOL/L (ref 98–107)
CHOLEST SERPL-MCNC: 237 MG/DL
CREAT SERPL-MCNC: 0.99 MG/DL (ref 0.67–1.17)
DEPRECATED HCO3 PLAS-SCNC: 29 MMOL/L (ref 22–29)
EGFRCR SERPLBLD CKD-EPI 2021: 88 ML/MIN/1.73M2
ERYTHROCYTE [DISTWIDTH] IN BLOOD BY AUTOMATED COUNT: 11.8 % (ref 10–15)
ERYTHROCYTE [SEDIMENTATION RATE] IN BLOOD BY WESTERGREN METHOD: 1 MM/HR (ref 0–20)
FASTING STATUS PATIENT QL REPORTED: NO
GLUCOSE SERPL-MCNC: 94 MG/DL (ref 70–99)
HCT VFR BLD AUTO: 45.9 % (ref 40–53)
HDLC SERPL-MCNC: 88 MG/DL
HGB BLD-MCNC: 15.2 G/DL (ref 13.3–17.7)
LDLC SERPL CALC-MCNC: 135 MG/DL
MCH RBC QN AUTO: 31.6 PG (ref 26.5–33)
MCHC RBC AUTO-ENTMCNC: 33.1 G/DL (ref 31.5–36.5)
MCV RBC AUTO: 95 FL (ref 78–100)
NONHDLC SERPL-MCNC: 149 MG/DL
PLATELET # BLD AUTO: 209 10E3/UL (ref 150–450)
POTASSIUM SERPL-SCNC: 4.1 MMOL/L (ref 3.4–5.3)
PROT SERPL-MCNC: 6.9 G/DL (ref 6.4–8.3)
PSA SERPL DL<=0.01 NG/ML-MCNC: 1.99 NG/ML (ref 0–3.5)
RBC # BLD AUTO: 4.81 10E6/UL (ref 4.4–5.9)
SODIUM SERPL-SCNC: 139 MMOL/L (ref 135–145)
TRIGL SERPL-MCNC: 72 MG/DL
TSH SERPL DL<=0.005 MIU/L-ACNC: 1.66 UIU/ML (ref 0.3–4.2)
WBC # BLD AUTO: 4.5 10E3/UL (ref 4–11)

## 2024-01-02 PROCEDURE — 91320 SARSCV2 VAC 30MCG TRS-SUC IM: CPT | Performed by: INTERNAL MEDICINE

## 2024-01-02 PROCEDURE — 80061 LIPID PANEL: CPT | Performed by: INTERNAL MEDICINE

## 2024-01-02 PROCEDURE — 80053 COMPREHEN METABOLIC PANEL: CPT | Performed by: INTERNAL MEDICINE

## 2024-01-02 PROCEDURE — 90471 IMMUNIZATION ADMIN: CPT | Performed by: INTERNAL MEDICINE

## 2024-01-02 PROCEDURE — 90686 IIV4 VACC NO PRSV 0.5 ML IM: CPT | Performed by: INTERNAL MEDICINE

## 2024-01-02 PROCEDURE — 90480 ADMN SARSCOV2 VAC 1/ONLY CMP: CPT | Performed by: INTERNAL MEDICINE

## 2024-01-02 PROCEDURE — 86364 TISS TRNSGLTMNASE EA IG CLAS: CPT | Performed by: INTERNAL MEDICINE

## 2024-01-02 PROCEDURE — 85027 COMPLETE CBC AUTOMATED: CPT | Performed by: INTERNAL MEDICINE

## 2024-01-02 PROCEDURE — 36415 COLL VENOUS BLD VENIPUNCTURE: CPT | Performed by: INTERNAL MEDICINE

## 2024-01-02 PROCEDURE — 84443 ASSAY THYROID STIM HORMONE: CPT | Performed by: INTERNAL MEDICINE

## 2024-01-02 PROCEDURE — 99396 PREV VISIT EST AGE 40-64: CPT | Mod: 25 | Performed by: INTERNAL MEDICINE

## 2024-01-02 PROCEDURE — G0103 PSA SCREENING: HCPCS | Performed by: INTERNAL MEDICINE

## 2024-01-02 PROCEDURE — 85652 RBC SED RATE AUTOMATED: CPT | Performed by: INTERNAL MEDICINE

## 2024-01-02 RX ORDER — TADALAFIL 5 MG/1
5 TABLET ORAL DAILY
Qty: 90 TABLET | Refills: 3 | Status: SHIPPED | OUTPATIENT
Start: 2024-01-02

## 2024-01-02 ASSESSMENT — ENCOUNTER SYMPTOMS
HEMATURIA: 0
HEARTBURN: 1
ABDOMINAL PAIN: 0
WEAKNESS: 1
SORE THROAT: 0
CHILLS: 0
DIZZINESS: 0
PALPITATIONS: 0
PARESTHESIAS: 0
HEMATOCHEZIA: 0
JOINT SWELLING: 0
SHORTNESS OF BREATH: 1
FEVER: 0
ARTHRALGIAS: 1
MYALGIAS: 0
DIARRHEA: 0
DYSURIA: 0
COUGH: 0
HEADACHES: 0
EYE PAIN: 0
FREQUENCY: 0
CONSTIPATION: 0
NAUSEA: 0
NERVOUS/ANXIOUS: 0

## 2024-01-02 ASSESSMENT — PAIN SCALES - GENERAL: PAINLEVEL: NO PAIN (0)

## 2024-01-02 NOTE — PROGRESS NOTES
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 It appears that in August 2023 he was due to initiate Briviact due to intolerance to Keppra. But was unable to initiate this medication due to difficulty obtaining the prescription     Patient also is dealing with rt hip pain where he has labral tea in the past  He has tinnitus, could not tolerate hearing aids  He also has rt arm pit hyperhidrosis    He lost weight 20 lb  Has been eating 80 percent of appetite  No new symptoms     He has nocturia, some trouble with urination inititation  Uses [facility] for ED related to BPH      1/2/2024     9:50 AM   Additional Questions   Roomed by Mirella         Healthy Habits:     Getting at least 3 servings of Calcium per day:  Yes    Bi-annual eye exam:  NO    Dental care twice a year:  NO    Sleep apnea or symptoms of sleep apnea:  Daytime drowsiness    Diet:  Gluten-free/reduced    Frequency of exercise:  2-3 days/week    Duration of exercise:  45-60 minutes    Taking medications regularly:  Yes    Medication side effects:  No muscle aches, No significant flushing and Other    Additional concerns today:  No                      Social History     Tobacco Use    Smoking status: Never    Smokeless tobacco: Never   Substance Use Topics    Alcohol use: Yes     Comment: 2-3x week             1/2/2024     9:43 AM   Alcohol Use   Prescreen: >3 drinks/day or >7 drinks/week? No         7/22/2022    10:48 AM   AUDIT - Alcohol Use Disorders Identification Test - Reproduced from the World Health Organization Audit  2001 (Second Edition)   1.  How often do you have a drink containing alcohol? 4 or more times a week   2.  How many drinks containing alcohol do you have on a typical day when you are drinking? 1 or 2   3.  How often do you have five or more drinks on one occasion? Less than monthly   4.  How often during the last year have you found that you were not able to stop drinking once you had started? Never   5.  How often during the last year have you failed to do what was normally expected of you because of drinking? Never   6.  How often during the last year have you needed a first drink in the morning to get yourself going after a heavy drinking session? Never   7.  How often during the last year have you had a feeling of guilt or remorse after drinking? Never   8.  How often during the last year have you been unable to remember what happened the night before because of your drinking? Never   9.  Have you or someone else been injured because of your drinking? No   10. Has a relative, friend, doctor or other health care worker been concerned about your drinking or suggested you cut down? No   TOTAL SCORE 5       Last PSA:   Prostate Specific Antigen Screen   Date Value Ref Range Status   01/02/2024 1.99 0.00 - 3.50 ng/mL Final   06/27/2022 1.54 0.00 - 4.00 ug/L Final       Reviewed orders with patient. Reviewed health maintenance and updated orders accordingly - Yes      Reviewed and updated as needed this visit by clinical staff   Tobacco  Allergies  Meds  Problems  Med Hx  Surg Hx  Fam Hx          Reviewed and updated as needed this visit by Provider     Meds  Problems  Med Hx  Surg Hx  Fam Hx         Past Medical History:   Diagnosis Date    DDD (degenerative disc disease), cervical     Family history of prostate cancer     FH: chemotherapy     Gastroesophageal reflux disease     Glioma (H)     S/P radiation therapy     Seasonal allergies     Seizures (H)     Tear of right acetabular labrum      "Uncomplicated asthma     exercise induced    Varicose vein of leg       Past Surgical History:   Procedure Laterality Date    BRAIN SURGERY  07/2016    HEAD & NECK SURGERY      partial brain tumor resection    HERNIORRHAPHY UMBILICAL N/A 5/15/2018    Procedure: HERNIORRHAPHY UMBILICAL;;  Surgeon: Blake Whitley MD;  Location: [facility]    LAPAROSCOPIC HERNIORRHAPHY INGUINAL BILATERAL Bilateral 5/15/2018    Procedure: LAPAROSCOPIC HERNIORRHAPHY INGUINAL BILATERAL;  LAPAROSCOPIC  BILATERAL  INGUINAL HERNIA REPAIR WITH MESH, OPEN UMBILICAL HERNIA REPAIR WITH MESH;  Surgeon: Blake Whitley MD;  Location: [facility]    ORTHOPEDIC SURGERY      left labral tear repair       Review of Systems   Constitutional:  Negative for chills and fever.   HENT:  Positive for congestion and hearing loss. Negative for ear pain and sore throat.    Eyes:  Negative for pain and visual disturbance.   Respiratory:  Positive for shortness of breath. Negative for cough.    Cardiovascular:  Negative for chest pain, palpitations and peripheral edema.   Gastrointestinal:  Positive for heartburn. Negative for abdominal pain, constipation, diarrhea, hematochezia and nausea.   Genitourinary:  Negative for dysuria, frequency, genital sores, hematuria, impotence, penile discharge and urgency.   Musculoskeletal:  Positive for arthralgias. Negative for joint swelling and myalgias.   Skin:  Negative for rash.   Neurological:  Positive for weakness. Negative for dizziness, headaches and paresthesias.   Psychiatric/Behavioral:  Negative for mood changes. The patient is not nervous/anxious.          OBJECTIVE:   /68 (BP Location: Left arm, Patient Position: Chair, Cuff Size: Adult Regular)   Pulse 67   Temp 97.3  F (36.3  C) (Temporal)   Ht 1.816 m (5' 11.5\")   Wt 71.7 kg (158 lb)   SpO2 97%   BMI 21.73 kg/m      Physical Exam  GENERAL: healthy, alert and no distress  EYES: Eyes grossly normal to inspection, PERRL and conjunctivae and " sclerae normal  HENT: rt skull surgery   ear canals and TM's normal, nose and mouth without ulcers or lesions  NECK: no adenopathy, no asymmetry, masses, or scars and thyroid normal to palpation  RESP: lungs clear to auscultation - no rales, rhonchi or wheezes  CV: regular rate and rhythm, normal S1 S2, no S3 or S4, no murmur, click or rub, no peripheral edema and peripheral pulses strong  ABDOMEN: soft, nontender, no hepatosplenomegaly, no masses and bowel sounds normal   (male): normal male genitalia without lesions or urethral discharge, no hernia  MS: no gross musculoskeletal defects noted, no edema  SKIN: no suspicious lesions or rashes  NEURO: Normal strength and tone, mentation intact and speech normal  PSYCH: mentation appears normal, affect normal/bright        ASSESSMENT/PLAN:   Timmy was seen today for physical and imm/inj.    Diagnoses and all orders for this visit:    Routine general medical examination at a health care facility  -     REVIEW OF HEALTH MAINTENANCE PROTOCOL ORDERS  Patient is due for colon exam this year  He will take Shingrix  With diet and exercise a good and well-controlled about weight loss although that is intentional  Glioma (H)  -     REVIEW OF HEALTH MAINTENANCE PROTOCOL ORDERS  MRI from [facility] is noticed  Seizure disorder (H)  -     REVIEW OF HEALTH MAINTENANCE PROTOCOL ORDERS  These are sensory seizures on the left side  t appears that in August 2023 he was due to initiate Briviact due to intolerance to Keppra. But was unable to initiate this medication due to difficulty obtaining the prescription   He plans to begin that now  Non-celiac gluten sensitivity    Normal diet therefore eats is good opportunity to take antibodies in the blood  -     REVIEW OF HEALTH MAINTENANCE PROTOCOL ORDERS  -     Tissue transglutaminase michael IgA and IgG; Future  -     Tissue transglutaminase michael IgA and IgG    Gastroesophageal reflux disease with esophagitis without hemorrhage no new  symptoms  -     REVIEW OF HEALTH MAINTENANCE PROTOCOL ORDERS  -     CBC with Platelets  ; Future  -     CBC with Platelets      Benign prostatic hyperplasia with urinary obstruction  -     REVIEW OF HEALTH MAINTENANCE PROTOCOL ORDERS  -     tadalafil (CIALIS) 5 MG tablet; Take 1 tablet (5 mg) by mouth daily  -     PSA, screen; Future  -     PSA, screen    Weight loss patient will notify me in 3 months about that  -     Comprehensive metabolic panel; Future  -     TSH with free T4 reflex; Future  -     ESR: Erythrocyte sedimentation rate; Future  -     Comprehensive metabolic panel  -     TSH with free T4 reflex  -     ESR: Erythrocyte sedimentation rate    Chronic fatigue  -     ESR: Erythrocyte sedimentation rate; Future  -     ESR: Erythrocyte sedimentation rate  Multifactorial  Closed fracture of transverse process of lumbar vertebra with routine healing, subsequent encounter this was years ago and patient still has right hip pain but unrelated to this  A DEXA scan should be done  He will see orthopedics for his right hip issues  -     DX Hip/Pelvis/Spine; Future    Screening PSA (prostate specific antigen)  -     PSA, screen; Future  -     PSA, screen    Encounter for immunization  -     COVID-19 12+ (2023-24) (PFIZER)    CARDIOVASCULAR SCREENING; LDL GOAL LESS THAN 100  -     Lipid panel reflex to direct LDL Fasting; Future  -     Lipid panel reflex to direct LDL Fasting    Need for prophylactic vaccination and inoculation against influenza    Other orders  -     INFLUENZA VACCINE IM > 6 MONTHS VALENT IIV4 (AFLURIA/FLUZONE)    Colon exam will also be arranged          COUNSELING:   Reviewed preventive health counseling, as reflected in patient instructions       Immunizations  Vaccinated for: Covid-19 and Influenza          He reports that he has never smoked. He has never used smokeless tobacco.          Suyapa Sinha MD  [facility]

## 2024-01-03 LAB
TTG IGA SER-ACNC: 0.4 U/ML
TTG IGG SER-ACNC: <0.6 U/ML

## 2024-02-08 ENCOUNTER — TELEPHONE (OUTPATIENT)
Dept: GASTROENTEROLOGY | Facility: CLINIC | Age: 59
End: 2024-02-08
Payer: COMMERCIAL

## 2024-02-08 NOTE — TELEPHONE ENCOUNTER
"Endoscopy Scheduling Screen    Have you had a positive Covid test in the last 14 days?  No    Are you active on MyChart?   Yes    What insurance is in the chart?  Other:  [employer]    Ordering/Referring Provider: Rabia   (If ordering provider performs procedure, schedule with ordering provider unless otherwise instructed. )    BMI: Estimated body mass index is 21.73 kg/m  as calculated from the following:    Height as of 1/2/24: 1.816 m (5' 11.5\").    Weight as of 1/2/24: 71.7 kg (158 lb).     Sedation Ordered  moderate sedation.   If patient BMI > 50 do not schedule in [facility].    [facility] 45 [facility]    Are you taking methadone or Suboxone?  No    Have you had difficulties, pain, or discomfort during past endoscopy procedures?  No    Are you taking any prescription medications for pain 3 or more times per week?   NO - No RN review required.    Do you have a history of malignant hyperthermia or adverse reaction to anesthesia?  No    (Females) Are you currently pregnant?   No     Have you been diagnosed or told you have pulmonary hypertension?   No    Do you have an LVAD?  No    Have you been told you have moderate to severe sleep apnea?  No    Have you been told you have COPD, asthma, or any other lung disease?  No    Do you have any heart conditions?  No     Have you ever had an organ transplant?   No    Have you ever had or are you awaiting a heart or lung transplant?   No    Have you had a stroke or transient ischemic attack (TIA aka \"mini stroke\" in the last 6 months?   No    Have you been diagnosed with or been told you have cirrhosis of the liver?   No    Are you currently on dialysis?   No    Do you need assistance transferring?   No    BMI: Estimated body mass index is 21.73 kg/m  as calculated from the following:    Height as of 1/2/24: 1.816 m (5' 11.5\").    Weight as of 1/2/24: 71.7 kg (158 lb).     Is patients BMI > 40 and scheduling location [facility]?  No    Do you take an injectable " medication for weight loss or diabetes (excluding insulin)?  No    Do you take the medication Naltrexone?  No    Do you take blood thinners?  No       Prep   Are you currently on dialysis or do you have chronic kidney disease?  No    Do you have a diagnosis of diabetes?  No    Do you have a diagnosis of cystic fibrosis (CF)?  No    On a regular basis do you go 3 -5 days between bowel movements?  No    BMI > 40?  No    Preferred Pharmacy:    [facility]      Final Scheduling Details   Colonoscopy prep sent?  N/A    Procedure scheduled  Colonoscopy    Surgeon:  Michael     Date of procedure:  04/25/2024     Pre-OP / PAC:   No - Not required for this site.    Location  [facility] - Patient preference.    Sedation   Moderate Sedation - Per order.      Patient Reminders:   You will receive a call from a Nurse to review instructions and health history.  This assessment must be completed prior to your procedure.  Failure to complete the Nurse assessment may result in the procedure being cancelled.      On the day of your procedure, please designate an adult(s) who can drive you home stay with you for the next 24 hours. The medicines used in the exam will make you sleepy. You will not be able to drive.      You cannot take public transportation, ride share services, or non-medical taxi service without a responsible caregiver.  Medical transport services are allowed with the requirement that a responsible caregiver will receive you at your destination.  We require that drivers and caregivers are confirmed prior to your procedure.

## 2024-03-12 DIAGNOSIS — Z12.11 SPECIAL SCREENING FOR MALIGNANT NEOPLASMS, COLON: Primary | ICD-10-CM

## 2024-04-11 ENCOUNTER — TELEPHONE (OUTPATIENT)
Dept: GASTROENTEROLOGY | Facility: CLINIC | Age: 59
End: 2024-04-11
Payer: COMMERCIAL

## 2024-04-11 NOTE — TELEPHONE ENCOUNTER
Pre assessment completed for upcoming procedure.   (Please see previous telephone encounter notes for complete details)      Procedure details:    Arrival time and facility location reviewed.    Pre op exam needed? N/A    Designated  policy reviewed. Instructed to have someone stay 6  hours post procedure.       Medication review:    Medications reviewed. Please see supporting documentation below. Holding recommendations discussed (if applicable).       Prep for procedure:     Procedure prep instructions reviewed.        Any additional information needed:  N/A      Patient  verbalized understanding and had no questions or concerns at this time.      Shawna Mcconnell RN  Endoscopy Procedure Pre Assessment RN  [phone] option 4

## 2024-04-11 NOTE — TELEPHONE ENCOUNTER
Pre visit planning completed.      Procedure details:    Patient scheduled for Colonoscopy  on 4.25.2024.     Arrival time: 1215. Procedure time 1300    Facility location: [facility]; [facility] [address] [place] [address]. Check in location: 1st Floor [facility], [place], operated by [facility].     Sedation type: Conscious sedation     Pre op exam needed? N/A    Indication for procedure: screening colonoscopy      Chart review:     Electronic implanted devices? No    Recent diagnosis of diverticulitis within the last 6 weeks? No    Diabetic? No      Medication review:    Anticoagulants? No    NSAIDS? No NSAID medications per patient's medication list.  RN will verify with pre-assessment call.    Other medication HOLDING recommendations:  N/A      Prep for procedure:     Bowel prep recommendation: Standard Miralax  Due to: standard bowel prep.    Prep instructions sent via [employer]  by CRC team.        Shawna Mcconnell RN  Endoscopy Procedure Pre Assessment RN  [phone] option 4

## 2024-04-24 ENCOUNTER — LAB (OUTPATIENT)
Dept: LAB | Facility: CLINIC | Age: 59
End: 2024-04-24
Payer: COMMERCIAL

## 2024-04-24 DIAGNOSIS — E78.5 HYPERLIPIDEMIA LDL GOAL <100: ICD-10-CM

## 2024-04-24 LAB
CHOLEST SERPL-MCNC: 198 MG/DL
FASTING STATUS PATIENT QL REPORTED: YES
HDLC SERPL-MCNC: 74 MG/DL
LDLC SERPL CALC-MCNC: 111 MG/DL
NONHDLC SERPL-MCNC: 124 MG/DL
TRIGL SERPL-MCNC: 65 MG/DL

## 2024-04-24 PROCEDURE — 36415 COLL VENOUS BLD VENIPUNCTURE: CPT

## 2024-04-24 PROCEDURE — 80061 LIPID PANEL: CPT

## 2024-04-25 ENCOUNTER — HOSPITAL ENCOUNTER (OUTPATIENT)
Facility: CLINIC | Age: 59
Discharge: HOME | End: 2024-04-25
Attending: COLON & RECTAL SURGERY | Admitting: COLON & RECTAL SURGERY
Payer: COMMERCIAL

## 2024-04-25 VITALS
WEIGHT: 160 LBS | HEART RATE: 52 BPM | DIASTOLIC BLOOD PRESSURE: 72 MMHG | BODY MASS INDEX: 21.67 KG/M2 | RESPIRATION RATE: 11 BRPM | OXYGEN SATURATION: 99 % | SYSTOLIC BLOOD PRESSURE: 104 MMHG | HEIGHT: 72 IN

## 2024-04-25 LAB — COLONOSCOPY: NORMAL

## 2024-04-25 PROCEDURE — G0121 COLON CA SCRN NOT HI RSK IND: HCPCS | Performed by: COLON & RECTAL SURGERY

## 2024-04-25 PROCEDURE — G0500 MOD SEDAT ENDO SERVICE >5YRS: HCPCS | Performed by: COLON & RECTAL SURGERY

## 2024-04-25 PROCEDURE — 250N000011 HC RX IP 250 OP 636: Performed by: COLON & RECTAL SURGERY

## 2024-04-25 PROCEDURE — 45378 DIAGNOSTIC COLONOSCOPY: CPT | Performed by: COLON & RECTAL SURGERY

## 2024-04-25 RX ORDER — FENTANYL CITRATE 50 UG/ML
INJECTION, SOLUTION INTRAMUSCULAR; INTRAVENOUS PRN
Status: DISCONTINUED | OUTPATIENT
Start: 2024-04-25 | End: 2024-04-25 | Stop reason: HOSPADM

## 2024-04-25 RX ORDER — LIDOCAINE 40 MG/G
CREAM TOPICAL
Status: DISCONTINUED | OUTPATIENT
Start: 2024-04-25 | End: 2024-04-25 | Stop reason: HOSPADM

## 2024-04-25 RX ORDER — ONDANSETRON 2 MG/ML
4 INJECTION INTRAMUSCULAR; INTRAVENOUS
Status: DISCONTINUED | OUTPATIENT
Start: 2024-04-25 | End: 2024-04-25 | Stop reason: HOSPADM

## 2024-04-25 ASSESSMENT — ACTIVITIES OF DAILY LIVING (ADL)
ADLS_ACUITY_SCORE: 35
ADLS_ACUITY_SCORE: 35

## 2024-04-25 NOTE — H&P
Colon & Rectal Surgery History and Physical  Pre-Endoscopy Procedure Note    History of Present Illness   I have been asked by Dr. Sinha to evaluate this 59 year old male for colorectal cancer screening. He currently denies any abdominal pain, weight loss, bleeding per rectum, or recent change in bowel habits.    Past Medical History  Diagnosis Date    DDD (degenerative disc disease), cervical     Gastroesophageal reflux disease     Glioma     S/P radiation therapy     Seasonal allergies     Seizures     Tear of right acetabular labrum     Uncomplicated asthma     exercise induced    Varicose vein of leg        Past Surgical History  Procedure Laterality Date    BRAIN SURGERY  07/2016    HEAD & NECK SURGERY      partial brain tumor resection    HERNIORRHAPHY UMBILICAL N/A 5/15/2018    Procedure: HERNIORRHAPHY UMBILICAL;;  Surgeon: Blake Whitley MD;  Location: [facility]    LAPAROSCOPIC HERNIORRHAPHY INGUINAL BILATERAL Bilateral 5/15/2018    Procedure: LAPAROSCOPIC HERNIORRHAPHY INGUINAL BILATERAL;  LAPAROSCOPIC  BILATERAL  INGUINAL HERNIA REPAIR WITH MESH, OPEN UMBILICAL HERNIA REPAIR WITH MESH;  Surgeon: Blake Whitley MD;  Location: [facility]    ORTHOPEDIC SURGERY      left labral tear repair        Medications  Medications Prior to Admission   Medication Sig    Brivaracetam (BRIVIACT) 50 MG tablet Take 50 mg by mouth 2 times daily    Calcium Carbonate Antacid (TUMS PO) Take 1 tablet by mouth as needed    lamoTRIgine (LAMICTAL) 100 MG tablet Take 3 tablets (300 mg) by mouth 2 times daily    famotidine (PEPCID) 20 MG tablet Take 1 tablet (20 mg) by mouth 2 times daily    fluticasone (VERAMYST) 27.5 MCG/SPRAY spray Spray 2 sprays into both nostrils daily as needed for rhinitis or allergies    fluticasone furoate 27.5 MCG/SPRAY nasal spray Spray 2 sprays in nostril    tadalafil (CIALIS) 5 MG tablet Take 1 tablet (5 mg) by mouth daily       Allergies  Allergen Reactions    Gramineae Pollens Other (See  Comments)     sneezing        Family History   Family history includes Celiac Disease in his sister; Cerebrovascular Disease in his paternal grandmother; Coronary Artery Disease (age of onset: 50) in his father; Hip dysplasia in his mother; Hyperlipidemia in his father; Hypertension in his mother; Lung Cancer (age of onset: 80) in his father; Osteogenesis imperfecta in his brother; Other Cancer in his father; Prostate Cancer (age of onset: 70) in his father; Substance Abuse in his maternal grandmother; Thyroid Disease in his daughter.     Social History   He reports that he has never smoked. He has never used smokeless tobacco. He reports current alcohol use. He reports that he does not use drugs.    Review of Systems   Constitutional:  No fever, weight change or fatigue.    Eyes:     No dry eyes or vision changes.   Ears/Nose/Throat/Neck:  No oral ulcers, sore throat or voice change.    Cardiovascular:   No palpitations, syncope, angina or edema.   Respiratory:    No chest pain, excessive sleepiness, shortness of breath or hemoptysis.    Gastrointestinal:   No abdominal pain, nausea, vomiting, diarrhea or heartburn.    Genitourinary:   No dysuria, hematuria, urinary retention or urinary frequency.   Musculoskeletal:  No joint swelling or arthralgias.    Dermatologic:  No skin rash or other skin changes.   Neurologic:    No focal weakness or numbness. No neuropathy.   Psychiatric:    No depression, anxiety, suicidal ideation, or paranoid ideation.   Endocrine:   No cold or heat intolerance, polydipsia, hirsutism, change in libido, or flushing.   Hematology/Lymphatic:  No bleeding or lymphadenopathy.    Allergy/Immunology:  No rhinitis or hives.     Physical Exam   Vitals:  Resp. rate 14, height 1.829 m (6'), weight 72.6 kg (160 lb), SpO2 98%.    General:  Alert and oriented to person, place and time   Airway: Normal oropharyngeal airway and neck mobility   Lungs:  Clear bilaterally   Heart:  Regular rate and  rhythm   Abdomen: Soft, NT, ND, no masses   Extremities: Warm, good capillary refill    ASA Grade: II (mild systemic disease)    Impression: Cleared for use of conscious sedation for colorectal cancer screening    Plan: Proceed with colonoscopy     Ashley Rodriguez MD  [facility]  [phone]

## 2024-10-16 ENCOUNTER — TRANSFERRED RECORDS (OUTPATIENT)
Dept: HEALTH INFORMATION MANAGEMENT | Facility: CLINIC | Age: 59
End: 2024-10-16

## 2024-11-05 ENCOUNTER — TRANSFERRED RECORDS (OUTPATIENT)
Dept: HEALTH INFORMATION MANAGEMENT | Facility: CLINIC | Age: 59
End: 2024-11-05
Payer: COMMERCIAL

## 2024-11-15 ENCOUNTER — LAB (OUTPATIENT)
Dept: LAB | Facility: CLINIC | Age: 59
End: 2024-11-15
Payer: COMMERCIAL

## 2024-11-15 DIAGNOSIS — C71.9 GLIOMA (H): ICD-10-CM

## 2024-11-15 LAB
ERYTHROCYTE [DISTWIDTH] IN BLOOD BY AUTOMATED COUNT: 12 % (ref 10–15)
HCT VFR BLD AUTO: 45.7 % (ref 40–53)
HGB BLD-MCNC: 15.4 G/DL (ref 13.3–17.7)
MCH RBC QN AUTO: 31.8 PG (ref 26.5–33)
MCHC RBC AUTO-ENTMCNC: 33.7 G/DL (ref 31.5–36.5)
MCV RBC AUTO: 94 FL (ref 78–100)
PLATELET # BLD AUTO: 234 10E3/UL (ref 150–450)
RBC # BLD AUTO: 4.85 10E6/UL (ref 4.4–5.9)
WBC # BLD AUTO: 5.7 10E3/UL (ref 4–11)

## 2024-11-15 PROCEDURE — 80048 BASIC METABOLIC PNL TOTAL CA: CPT

## 2024-11-15 PROCEDURE — 36415 COLL VENOUS BLD VENIPUNCTURE: CPT

## 2024-11-15 PROCEDURE — 85027 COMPLETE CBC AUTOMATED: CPT

## 2024-11-16 LAB
ANION GAP SERPL CALCULATED.3IONS-SCNC: 11 MMOL/L (ref 7–15)
BUN SERPL-MCNC: 14.1 MG/DL (ref 8–23)
CALCIUM SERPL-MCNC: 9.5 MG/DL (ref 8.8–10.4)
CHLORIDE SERPL-SCNC: 103 MMOL/L (ref 98–107)
CREAT SERPL-MCNC: 0.92 MG/DL (ref 0.67–1.17)
EGFRCR SERPLBLD CKD-EPI 2021: >90 ML/MIN/1.73M2
GLUCOSE SERPL-MCNC: 111 MG/DL (ref 70–99)
HCO3 SERPL-SCNC: 26 MMOL/L (ref 22–29)
POTASSIUM SERPL-SCNC: 4.1 MMOL/L (ref 3.4–5.3)
SODIUM SERPL-SCNC: 140 MMOL/L (ref 135–145)

## 2024-12-02 ENCOUNTER — MYC MEDICAL ADVICE (OUTPATIENT)
Dept: FAMILY MEDICINE | Facility: CLINIC | Age: 59
End: 2024-12-02
Payer: COMMERCIAL

## 2024-12-02 DIAGNOSIS — C71.9 GLIOMA (H): Primary | ICD-10-CM

## 2024-12-03 ENCOUNTER — LAB (OUTPATIENT)
Dept: LAB | Facility: CLINIC | Age: 59
End: 2024-12-03
Payer: COMMERCIAL

## 2024-12-03 DIAGNOSIS — C71.9 GLIOMA (H): ICD-10-CM

## 2024-12-03 PROCEDURE — 80053 COMPREHEN METABOLIC PANEL: CPT

## 2024-12-03 PROCEDURE — 36415 COLL VENOUS BLD VENIPUNCTURE: CPT

## 2024-12-04 LAB
ALBUMIN SERPL BCG-MCNC: 4.7 G/DL (ref 3.5–5.2)
ALP SERPL-CCNC: 60 U/L (ref 40–150)
ALT SERPL W P-5'-P-CCNC: 21 U/L (ref 0–70)
ANION GAP SERPL CALCULATED.3IONS-SCNC: 10 MMOL/L (ref 7–15)
AST SERPL W P-5'-P-CCNC: 23 U/L (ref 0–45)
BILIRUB SERPL-MCNC: 0.3 MG/DL
BUN SERPL-MCNC: 13.8 MG/DL (ref 8–23)
CALCIUM SERPL-MCNC: 9.7 MG/DL (ref 8.8–10.4)
CHLORIDE SERPL-SCNC: 99 MMOL/L (ref 98–107)
CREAT SERPL-MCNC: 0.99 MG/DL (ref 0.67–1.17)
EGFRCR SERPLBLD CKD-EPI 2021: 88 ML/MIN/1.73M2
GLUCOSE SERPL-MCNC: 94 MG/DL (ref 70–99)
HCO3 SERPL-SCNC: 30 MMOL/L (ref 22–29)
POTASSIUM SERPL-SCNC: 4.2 MMOL/L (ref 3.4–5.3)
PROT SERPL-MCNC: 6.8 G/DL (ref 6.4–8.3)
SODIUM SERPL-SCNC: 139 MMOL/L (ref 135–145)

## 2024-12-19 ENCOUNTER — HOSPITAL ENCOUNTER (OUTPATIENT)
Dept: GENERAL RADIOLOGY | Facility: CLINIC | Age: 59
Discharge: HOME | End: 2024-12-19
Attending: ORTHOPAEDIC SURGERY
Payer: COMMERCIAL

## 2024-12-19 ENCOUNTER — HOSPITAL ENCOUNTER (OUTPATIENT)
Dept: MRI IMAGING | Facility: CLINIC | Age: 59
Discharge: HOME | End: 2024-12-19
Attending: ORTHOPAEDIC SURGERY
Payer: COMMERCIAL

## 2024-12-19 VITALS — SYSTOLIC BLOOD PRESSURE: 119 MMHG | DIASTOLIC BLOOD PRESSURE: 60 MMHG | OXYGEN SATURATION: 95 % | HEART RATE: 65 BPM

## 2024-12-19 DIAGNOSIS — M25.552 PAIN OF LEFT HIP: ICD-10-CM

## 2024-12-19 PROCEDURE — 250N000009 HC RX 250: Performed by: PHYSICIAN ASSISTANT

## 2024-12-19 PROCEDURE — 255N000002 HC RX 255 OP 636: Performed by: PHYSICIAN ASSISTANT

## 2024-12-19 PROCEDURE — 77002 NEEDLE LOCALIZATION BY XRAY: CPT

## 2024-12-19 PROCEDURE — 73722 MRI JOINT OF LWR EXTR W/DYE: CPT | Mod: LT

## 2024-12-19 PROCEDURE — A9585 GADOBUTROL INJECTION: HCPCS | Performed by: PHYSICIAN ASSISTANT

## 2024-12-19 PROCEDURE — 96372 THER/PROPH/DIAG INJ SC/IM: CPT | Performed by: PHYSICIAN ASSISTANT

## 2024-12-19 PROCEDURE — 250N000011 HC RX IP 250 OP 636: Performed by: PHYSICIAN ASSISTANT

## 2024-12-19 RX ORDER — GADOBUTROL 604.72 MG/ML
0.1 INJECTION INTRAVENOUS ONCE
Status: COMPLETED | OUTPATIENT
Start: 2024-12-19 | End: 2024-12-19

## 2024-12-19 RX ORDER — EPINEPHRINE 1 MG/ML
0.1 INJECTION, SOLUTION, CONCENTRATE INTRAVENOUS ONCE
Status: COMPLETED | OUTPATIENT
Start: 2024-12-19 | End: 2024-12-19

## 2024-12-19 RX ORDER — LIDOCAINE HYDROCHLORIDE 10 MG/ML
5 INJECTION, SOLUTION EPIDURAL; INFILTRATION; INTRACAUDAL; PERINEURAL ONCE
Status: COMPLETED | OUTPATIENT
Start: 2024-12-19 | End: 2024-12-19

## 2024-12-19 RX ORDER — ROPIVACAINE HYDROCHLORIDE 5 MG/ML
30 INJECTION, SOLUTION EPIDURAL; INFILTRATION; PERINEURAL ONCE
Status: COMPLETED | OUTPATIENT
Start: 2024-12-19 | End: 2024-12-19

## 2024-12-19 RX ORDER — IOPAMIDOL 408 MG/ML
10 INJECTION, SOLUTION INTRATHECAL ONCE
Status: COMPLETED | OUTPATIENT
Start: 2024-12-19 | End: 2024-12-19

## 2024-12-19 RX ADMIN — EPINEPHRINE 0.1 ML: 1 INJECTION, SOLUTION, CONCENTRATE INTRAVENOUS at 13:27

## 2024-12-19 RX ADMIN — GADOBUTROL 0.1 ML: 604.72 INJECTION INTRAVENOUS at 13:27

## 2024-12-19 RX ADMIN — LIDOCAINE HYDROCHLORIDE 2 ML: 10 INJECTION, SOLUTION EPIDURAL; INFILTRATION; INTRACAUDAL; PERINEURAL at 13:25

## 2024-12-19 RX ADMIN — IOPAMIDOL 4 ML: 408 INJECTION, SOLUTION INTRATHECAL at 13:27

## 2024-12-19 RX ADMIN — ROPIVACAINE HYDROCHLORIDE 10 ML: 5 INJECTION, SOLUTION EPIDURAL; INFILTRATION; PERINEURAL at 13:28

## 2024-12-19 NOTE — PROCEDURES
RADIOLOGY PROCEDURE NOTE  Patient name: Scott Ocampo  MRN: [id number]  : 1965    Pre-procedure diagnosis: Left hip pain  Post-procedure diagnosis: Same    Procedure Date/Time: 2024  1:33 PM  Procedure: Left hip arthrogram with kalina and anesthetic  Estimated blood loss: None  Specimen(s) collected with description: none  The patient tolerated the procedure well with no immediate complications.  Significant findings:none    See imaging dictation for procedural details.    Provider name: Richi Pena PA-C  Assistant(s):None

## 2024-12-19 NOTE — DISCHARGE INSTRUCTIONS
JOINT ARTHROGRAM DISCHARGE INSTRUCTIONS    What to expect  Your joint may feel a little sore for the next day or two, but many people don't feel any different.   You should receive the imaging results from the provider who ordered this study within a few days.   What to do  Minimize your activity today. You may resume your normal activity tomorrow as tolerated.   You may shower tomorrow, but do not submerge in bathtub, hot tub or pool for 48 hours.    Use ice packs as needed for discomfort.  You may resume all medications, including blood thinners.  You may take over the counter acetaminophen (Tylenol) or ibuprofen (Motrin, Advil) for mild discomfort after the procedure.    What to watch for  Bruising or slight swelling at the puncture site can be normal. If you begin to develop redness or excessive swelling at the site, fever over 1010F, notable increase in pain, weakness, or numbness, please contact your primary care or referring provider.  If you have questions or concerns  You may contact the [facility] Radiology Department at [phone] between 8am-4:30pm Monday through Friday.  If you have urgent questions outside of these normal business hours, please contact the [facility] on-call physician at [phone].    The provider who performed your procedure was Richi Pena PA-C

## 2025-01-06 ENCOUNTER — TELEPHONE (OUTPATIENT)
Dept: FAMILY MEDICINE | Facility: CLINIC | Age: 60
End: 2025-01-06
Payer: COMMERCIAL

## 2025-01-06 NOTE — TELEPHONE ENCOUNTER
Reason for Call:  Appointment Request    Patient requesting this type of appt:  Preventive     Requested provider: Suyapa Sinha    Reason patient unable to be scheduled: Not within requested timeframe    When does patient want to be seen/preferred time: 3-7 days    Comments: none available until 10/2025. Accidentally canceled tomorrows. Would like to talk to provider about some medical changes     Could we send this information to you in [facility] or would you prefer to receive a phone call?:   Patient would prefer a phone call   Okay to leave a detailed message?: Yes at Home number on file [phone] (home)    Call taken on 1/6/2025 at 7:01 AM by Sydnie Prasad

## 2025-01-07 ENCOUNTER — OFFICE VISIT (OUTPATIENT)
Dept: FAMILY MEDICINE | Facility: CLINIC | Age: 60
End: 2025-01-07
Payer: COMMERCIAL

## 2025-01-07 VITALS
DIASTOLIC BLOOD PRESSURE: 66 MMHG | HEART RATE: 64 BPM | HEIGHT: 72 IN | BODY MASS INDEX: 21.81 KG/M2 | SYSTOLIC BLOOD PRESSURE: 108 MMHG | OXYGEN SATURATION: 99 % | TEMPERATURE: 97.1 F | RESPIRATION RATE: 18 BRPM | WEIGHT: 161 LBS

## 2025-01-07 DIAGNOSIS — R63.4 WEIGHT LOSS: ICD-10-CM

## 2025-01-07 DIAGNOSIS — H81.20 VESTIBULAR NEURONITIS, UNSPECIFIED LATERALITY: ICD-10-CM

## 2025-01-07 DIAGNOSIS — N40.1 BENIGN LOCALIZED PROSTATIC HYPERPLASIA WITH LOWER URINARY TRACT SYMPTOMS (LUTS): ICD-10-CM

## 2025-01-07 DIAGNOSIS — E78.5 HYPERLIPIDEMIA LDL GOAL <100: ICD-10-CM

## 2025-01-07 DIAGNOSIS — Z23 NEED FOR PROPHYLACTIC VACCINATION AND INOCULATION AGAINST INFLUENZA: ICD-10-CM

## 2025-01-07 DIAGNOSIS — G40.909 SEIZURE DISORDER (H): ICD-10-CM

## 2025-01-07 DIAGNOSIS — H90.5 SENSORINEURAL HEARING LOSS (SNHL) OF RIGHT EAR, UNSPECIFIED HEARING STATUS ON CONTRALATERAL SIDE: ICD-10-CM

## 2025-01-07 DIAGNOSIS — R20.0 NUMBNESS AND TINGLING OF LEFT ARM AND LEG: ICD-10-CM

## 2025-01-07 DIAGNOSIS — J32.0 CHRONIC MAXILLARY SINUSITIS: ICD-10-CM

## 2025-01-07 DIAGNOSIS — C71.9 GLIOMA (H): ICD-10-CM

## 2025-01-07 DIAGNOSIS — N40.1 BENIGN PROSTATIC HYPERPLASIA WITH LOWER URINARY TRACT SYMPTOMS, SYMPTOM DETAILS UNSPECIFIED: ICD-10-CM

## 2025-01-07 DIAGNOSIS — Z00.00 ROUTINE GENERAL MEDICAL EXAMINATION AT A HEALTH CARE FACILITY: Primary | ICD-10-CM

## 2025-01-07 DIAGNOSIS — R20.2 NUMBNESS AND TINGLING OF LEFT ARM AND LEG: ICD-10-CM

## 2025-01-07 DIAGNOSIS — K90.41 NON-CELIAC GLUTEN SENSITIVITY: ICD-10-CM

## 2025-01-07 LAB
CHOLEST SERPL-MCNC: 238 MG/DL
EST. AVERAGE GLUCOSE BLD GHB EST-MCNC: 114 MG/DL
FASTING STATUS PATIENT QL REPORTED: NO
HBA1C MFR BLD: 5.6 % (ref 0–5.6)
HDLC SERPL-MCNC: 74 MG/DL
LDLC SERPL CALC-MCNC: 150 MG/DL
NONHDLC SERPL-MCNC: 164 MG/DL
PSA SERPL DL<=0.01 NG/ML-MCNC: 2.31 NG/ML (ref 0–3.5)
TRIGL SERPL-MCNC: 68 MG/DL
TSH SERPL DL<=0.005 MIU/L-ACNC: 1.9 UIU/ML (ref 0.3–4.2)

## 2025-01-07 PROCEDURE — 80061 LIPID PANEL: CPT | Performed by: INTERNAL MEDICINE

## 2025-01-07 PROCEDURE — 90673 RIV3 VACCINE NO PRESERV IM: CPT | Performed by: INTERNAL MEDICINE

## 2025-01-07 PROCEDURE — 84443 ASSAY THYROID STIM HORMONE: CPT | Performed by: INTERNAL MEDICINE

## 2025-01-07 PROCEDURE — 83036 HEMOGLOBIN GLYCOSYLATED A1C: CPT | Performed by: INTERNAL MEDICINE

## 2025-01-07 PROCEDURE — 99396 PREV VISIT EST AGE 40-64: CPT | Mod: 25 | Performed by: INTERNAL MEDICINE

## 2025-01-07 PROCEDURE — 99214 OFFICE O/P EST MOD 30 MIN: CPT | Mod: 25 | Performed by: INTERNAL MEDICINE

## 2025-01-07 PROCEDURE — 36415 COLL VENOUS BLD VENIPUNCTURE: CPT | Performed by: INTERNAL MEDICINE

## 2025-01-07 PROCEDURE — G0103 PSA SCREENING: HCPCS | Performed by: INTERNAL MEDICINE

## 2025-01-07 PROCEDURE — 90471 IMMUNIZATION ADMIN: CPT | Performed by: INTERNAL MEDICINE

## 2025-01-07 RX ORDER — TOLTERODINE TARTRATE 2 MG/1
2 TABLET, EXTENDED RELEASE ORAL 2 TIMES DAILY
Qty: 31 TABLET | Refills: 0 | Status: SHIPPED | OUTPATIENT
Start: 2025-01-07

## 2025-01-07 SDOH — HEALTH STABILITY: PHYSICAL HEALTH: ON AVERAGE, HOW MANY DAYS PER WEEK DO YOU ENGAGE IN MODERATE TO STRENUOUS EXERCISE (LIKE A BRISK WALK)?: 3 DAYS

## 2025-01-07 ASSESSMENT — SOCIAL DETERMINANTS OF HEALTH (SDOH): HOW OFTEN DO YOU GET TOGETHER WITH FRIENDS OR RELATIVES?: ONCE A WEEK

## 2025-01-07 ASSESSMENT — PAIN SCALES - GENERAL: PAINLEVEL_OUTOF10: NO PAIN (0)

## 2025-01-07 NOTE — PROGRESS NOTES
Preventive Care Visit  [facility] VANESA Sinha MD, Internal Medicine  Jan 7, 2025      Assessment & Plan   Patient was accompanied by his daughter today  Routine general medical examination at a health care facility  Complicated very pleasant 59 years old gentleman  oligodendroglioma, IDH-mutant, 1p/19q codeleted s/p resection 2016, RT+TMZ followed by 4 cycles of TMZ, recurrence with re-RT followed by 12 cycles of TMZ completed in 2018, on surveillance   All the records from [facility] are reviewed  All the labs are reviewed and the labs which were not performed are ordered  Shingrix and flu shot will be given today      Seizure disorder (H)  Patient is now on Briviact and Lamictal  He is being monitored by neurology at [facility]    - REVIEW OF HEALTH MAINTENANCE PROTOCOL ORDERS  - Brivaracetam (BRIVIACT) 50 MG tablet    Glioma (H)  All the notes from [facility] including MRI scanning is reviewed  Patient is on ini that I am not familiar with and is recentlyb drug   - REVIEW OF HEALTH MAINTENANCE PROTOCOL ORDERS  I have discussed the MRI  He has some cognitive decline  Numbness and tingling of left arm and leg  This is generally related to his seizure without any generalization  - HEMOGLOBIN A1C  - HEMOGLOBIN A1C    Vestibular neuronitis, unspecified laterality  Stable more or less    Benign prostatic hyperplasia with lower urinary tract symptoms, symptom details unspecified  Patient has some symptoms which seem to be urgency symptoms at times but exam is normal  I would not treat him at present with medications  - REVIEW OF HEALTH MAINTENANCE PROTOCOL ORDERS    Non-celiac gluten sensitivity  Patient is on gluten-free diet  - REVIEW OF HEALTH MAINTENANCE PROTOCOL ORDERS  - HEMOGLOBIN A1C  - PROSTATE SPEC ANTIGEN SCREEN  - HEMOGLOBIN A1C  - PROSTATE SPEC ANTIGEN SCREEN    Sensorineural hearing loss (SNHL) of right ear, unspecified hearing status on contralateral side  Patient has some  hearing loss in the right ear from surgery    Weight loss  Weight loss is improving  - Lipid panel reflex to direct LDL Fasting  - TSH with free T4 reflex  - Lipid panel reflex to direct LDL Fasting  - TSH with free T4 reflex    Hyperlipidemia LDL goal <100  CT calcium score is 0 but there is family history of premature CAD  - Lipid panel reflex to direct LDL Fasting  - Lipid panel reflex to direct LDL Fasting    Benign localized prostatic hyperplasia with lower urinary tract symptoms (LUTS)  Detrol can be used for irritable symptoms when he travels but not every day  - tolterodine (DETROL) 2 MG tablet  Dispense: 31 tablet; Refill: 0    Chronic maxillary sinusitis  He will use Veramyst nasal spray and sinus lavages    Need for prophylactic vaccination and inoculation against influenza                Counseling  Appropriate preventive services were addressed with this patient via screening, questionnaire, or discussion as appropriate for fall prevention, nutrition, physical activity, T  Checklist reviewing preventive services available has been given to the patient.  Reviewed patient's diet, addressing concerns and/or questions.             Subjective   Timmy is a 59 year old, presenting for the following: Timmy has history of oligodendroglioma   IDH-mutant, 1p/19q codeleted s/p resection 2016, RT+TMZ followed by 4 cycles of TMZ, recurrence with re-RT followed by 12 cycles of TMZ completed in 2018, on surveillance   This is s/p resection and is on chemotherapy even now  There is a concern of mild progression  He also has history of seizure disorder and his medications has been adjusted  He has some cognitive change related to them or the tumor  He has chronic sinusitis  He has urgency of the urination sometimes during a trip  He continues to have some joint pains in the labral areas of both shoulders and also right hip  Physical and Imm/Inj (Flu Shot)        1/7/2025     9:53 AM   Additional Questions   Roomed by Mirella    Accompanied by daughter Sarai         1/7/2025   Forms   Any forms needing to be completed Yes          Health Care Directive  Patient does not have a Health Care Directive: Patient states has Advance Directive and will bring in a copy to clinic.      1/7/2025   General Health   How would you rate your overall physical health? Good   Feel stress (tense, anxious, or unable to sleep) Only a little   (!) STRESS CONCERN      1/7/2025   Nutrition   Three or more servings of calcium each day? Yes   Diet: Gluten-free/reduced   How many servings of fruit and vegetables per day? (!) 2-3   How many sweetened beverages each day? 0-1         1/7/2025   Exercise   Days per week of moderate/strenous exercise 3 days         1/7/2025   Social Factors   Frequency of gathering with friends or relatives Once a week   Worry food won't last until get money to buy more No   Food not last or not have enough money for food? No   Do you have housing? (Housing is defined as stable permanent housing and does not include staying ouside in a car, in a tent, in an abandoned building, in an overnight shelter, or couch-surfing.) Yes   Are you worried about losing your housing? No   Lack of transportation? No   Unable to get utilities (heat,electricity)? No         1/7/2025   Fall Risk   Fallen 2 or more times in the past year? Yes   Trouble with walking or balance? Yes   Gait Speed Test (Document in seconds) 3.5          1/7/2025   Dental   Dentist two times every year? (!) NO         1/7/2025   TB Screening   Were you born outside of the [place]? No         Today's PHQ-2 Score:       1/7/2025     9:46 AM   PHQ-2 ( 1999 [employer])   Q1: Little interest or pleasure in doing things 1   Q2: Feeling down, depressed or hopeless 1   PHQ-2 Score 2    Q1: Little interest or pleasure in doing things Several days   Q2: Feeling down, depressed or hopeless Several days   PHQ-2 Score 2       Patient-reported           1/7/2025   Substance Use   Alcohol more than  3/day or more than 7/wk No   Do you use any other substances recreationally? No     Social History     Tobacco Use    Smoking status: Never    Smokeless tobacco: Never   Vaping Use    Vaping status: Never Used   Substance Use Topics    Alcohol use: Yes     Comment: 4 servings per week on average    Drug use: No           1/7/2025   STI Screening   New sexual partner(s) since last STI/HIV test? No   Last PSA:   Prostate Specific Antigen Screen   Date Value Ref Range Status   01/02/2024 1.99 0.00 - 3.50 ng/mL Final   06/27/2022 1.54 0.00 - 4.00 ug/L Final     ASCVD Risk   The ASCVD Risk score ([place] VEE, et al., 2019) failed to calculate for the following reasons:    Risk score cannot be calculated because patient has a medical history suggesting prior/existing ASCVD           Reviewed and updated as needed this visit by Provider     Meds  Problems  Med Hx              BP Readings from Last 3 Encounters:   01/07/25 108/66   12/19/24 119/60   04/25/24 104/72    Wt Readings from Last 3 Encounters:   01/07/25 73 kg (161 lb)   04/25/24 72.6 kg (160 lb)   01/02/24 71.7 kg (158 lb)                  Patient Active Problem List   Diagnosis    Glioma (H)    Neoplasm of brain (H)    Family history of malignant neoplasm of prostate    Gastroesophageal reflux disease with esophagitis without hemorrhage    Non-celiac gluten sensitivity    Drug-induced erectile dysfunction    Chronic fatigue    Vestibular neuronitis, unspecified laterality    Tear of right acetabular labrum    Family history of coronary arteriosclerosis    Benign localized prostatic hyperplasia with lower urinary tract symptoms (LUTS)    Family history of celiac disease    Numbness and tingling of left arm and leg    Hyperlipidemia LDL goal <100     Past Surgical History:   Procedure Laterality Date    BRAIN SURGERY  07/2016    COLONOSCOPY N/A 4/25/2024    Procedure: Colonoscopy;  Surgeon: Ashley Rodriguez MD;  Location:  GI    HEAD & NECK  SURGERY      partial brain tumor resection    HERNIORRHAPHY UMBILICAL N/A 5/15/2018    Procedure: HERNIORRHAPHY UMBILICAL;;  Surgeon: Blake Whitley MD;  Location: [facility]    IR LUMBAR PUNCTURE  5/14/2016    LAPAROSCOPIC HERNIORRHAPHY INGUINAL BILATERAL Bilateral 5/15/2018    Procedure: LAPAROSCOPIC HERNIORRHAPHY INGUINAL BILATERAL;  LAPAROSCOPIC  BILATERAL  INGUINAL HERNIA REPAIR WITH MESH, OPEN UMBILICAL HERNIA REPAIR WITH MESH;  Surgeon: Blake Whitley MD;  Location: [facility]    ORTHOPEDIC SURGERY      left labral tear repair       Social History     Tobacco Use    Smoking status: Never    Smokeless tobacco: Never   Substance Use Topics    Alcohol use: Yes     Comment: 4 servings per week on average     Family History   Problem Relation Age of Onset    Hypertension Mother     Hip dysplasia Mother     Coronary Artery Disease Father 50    Hyperlipidemia Father     Prostate Cancer Father 70    Other Cancer Father     Lung Cancer Father 80        non smoker    Celiac Disease Sister     Osteogenesis imperfecta Brother     Substance Abuse Maternal Grandmother     Cerebrovascular Disease Paternal Grandmother     Thyroid Disease Daughter          Current Outpatient Medications   Medication Sig Dispense Refill    Brivaracetam (BRIVIACT) 50 MG tablet Take 2 tablets (100 mg) by mouth 2 times daily.      Calcium Carbonate Antacid (TUMS PO) Take 1 tablet by mouth as needed      famotidine (PEPCID) 20 MG tablet Take 1 tablet (20 mg) by mouth 2 times daily      fluticasone furoate 27.5 MCG/SPRAY nasal spray Spray 2 sprays in nostril      lamoTRIgine (LAMICTAL) 100 MG tablet Take 3 tablets (300 mg) by mouth 2 times daily      tolterodine (DETROL) 2 MG tablet Take 1 tablet (2 mg) by mouth 2 times daily. 31 tablet 0    VORASIDENIB PO Take by mouth daily.      fluticasone (VERAMYST) 27.5 MCG/SPRAY spray Spray 2 sprays into both nostrils daily as needed for rhinitis or allergies      tadalafil (CIALIS) 5 MG tablet Take  "1 tablet (5 mg) by mouth daily (Patient not taking: Reported on 1/7/2025) 90 tablet 3     Allergies   Allergen Reactions    Gramineae Pollens Other (See Comments)     sneezing         Review of Systems  Constitutional, HEENT, cardiovascular, pulmonary, GI, , musculoskeletal, neuro, skin, endocrine and psych systems are negative, except as otherwise noted.     Objective    Exam  /66 (BP Location: Left arm, Patient Position: Chair, Cuff Size: Adult Regular)   Pulse 64   Temp 97.1  F (36.2  C) (Temporal)   Resp 18   Ht 1.816 m (5' 11.5\")   Wt 73 kg (161 lb)   SpO2 99%   BMI 22.14 kg/m     Estimated body mass index is 22.14 kg/m  as calculated from the following:    Height as of this encounter: 1.816 m (5' 11.5\").    Weight as of this encounter: 73 kg (161 lb).    Physical Exam  GENERAL: alert and no distress  EYES: Eyes grossly normal to inspection, PERRL and conjunctivae and sclerae normal  HENT: ear canals and TM's normal, nose and mouth without ulcers or lesions  NECK: no adenopathy, no asymmetry, masses, or scars  RESP: lungs clear to auscultation - no rales, rhonchi or wheezes  CV: regular rate and rhythm, normal S1 S2, no S3 or S4, no murmur, click or rub, no peripheral edema  ABDOMEN: soft, nontender, no hepatosplenomegaly, no masses and bowel sounds normal   (male): normal male genitalia without lesions or urethral discharge, no hernia  RECTAL: normal sphincter tone, no rectal masses, prostate normal size, smooth, nontender without nodules or masses  MS: no gross musculoskeletal defects noted, no edema  SKIN: Few benign skin lesions no suspicious lesions or rashes  NEURO: Normal strength and tone, mentation intact and speech normal  PSYCH: mentation appears normal, affect normal/bright        Longitudinal plan of care was discussed with this patient about complex care of her condition during the visit today.I will continue to follow up and support her in the care of this complex condition.I " will also follow up as needed by phone or my chart.  I have reviewed the [facility] notes also  Signed Electronically by: Suyapa Sinha MD

## 2025-01-08 ENCOUNTER — PATIENT OUTREACH (OUTPATIENT)
Dept: CARE COORDINATION | Facility: CLINIC | Age: 60
End: 2025-01-08
Payer: COMMERCIAL

## 2025-01-08 NOTE — PROGRESS NOTES
Clinic Care Coordination Contact  [facility]/Voicemail    Clinical Data: Care Coordinator Outreach    Outreach Documentation Number of Outreach Attempt   1/8/2025   9:26 AM 1       Left message on patient's voicemail with call back information and requested return call.      Plan: Care Coordinator will try to reach patient again in 1-2 business days.    BISI Blood  Clinic Care Coordination  [facility]: [place], [place], [place], and [place] [facility]  Phone: [phone]

## 2025-01-09 NOTE — PROGRESS NOTES
Clinic Care Coordination Contact  Community Health Worker Initial Outreach    CHW introduced self, intent of call, and offered the CC program.  Spoke with patient.  Patient stated he is interested in applying for SSDI.    CHW Initial Information Gathering:  Referral Source: PCP  Preferred Hospital: [facility]  [phone]  Current living arrangement:: I live alone  Type of residence:: Private home - no stairs  Community Resources: None  Supplies Currently Used at Home: None  Equipment Currently Used at Home: none  Informal Support system:: Children, Family  No PCP office visit in Past Year: No  Transportation means:: Regular car  CHW Additional Questions  If ED/Hospital discharge, follow-up appointment scheduled as recommended?: N/A  Medication changes made following ED/Hospital discharge?: N/A  MyChart active?: Yes  Patient sent Social Drivers of Health questionnaire?: Yes    Patient accepts CC: Yes. Patient scheduled for assessment with NILSON Ritchie on 1/17/25 at 10:00am. Patient noted desire to discuss applying for SSDI and CC support.     BISI Blood  Clinic Care Coordination  [facility]: [place], [place], [place], and [place] [facility]  Phone: [phone]

## 2025-01-21 ENCOUNTER — PATIENT OUTREACH (OUTPATIENT)
Dept: CARE COORDINATION | Facility: CLINIC | Age: 60
End: 2025-01-21
Payer: COMMERCIAL

## 2025-01-21 NOTE — LETTER
[facility]  Patient Centered Plan of Care  About Me:        Patient Name:  Scott Ocampo    YOB: 1965  Age:         59 year old   [place] MRN:    [id number] Telephone Information:  Home Phone [phone]   Mobile [phone]       Address:  [address] [place] [address] Email address:  [email or web address]      Emergency Contact(s)    Name Relationship Lgl Grd Work Phone Home Phone Mobile Phone   1. SUGEY OCAMPO Daughter   [phone]           Primary language:  English     needed? No   [place] [facility]:  [phone] op. 1  Other communication barriers:None    Preferred Method of Communication:     Current living arrangement: I live alone    Mobility Status/ Medical Equipment: No data recorded      Health Maintenance  Health Maintenance Reviewed: Due/Overdue   Health Maintenance Due   Topic Date Due    Pneumococcal Vaccine: 50+ Years (1 of 2 - PCV) Never done    ZOSTER IMMUNIZATION (1 of 2) Never done    COVID-19 Vaccine (5 - 2024-25 season) 09/01/2024           My Access Plan  Medical Emergency 911   Primary Clinic Line [facility] [phone]   24 Hour Appointment Line [phone] or  [phone]) (toll-free)   24 Hour Nurse Line [phone] (toll-free)   Preferred Urgent Care [facility], [phone]     Preferred Hospital [facility]  [phone]     Preferred Pharmacy [id number] [address] [facility]     Behavioral Health Crisis Line The National Suicide Prevention Lifeline at 1-920.576.6887 or Text/Call 928           My Care Team Members  Patient Care Team         Relationship Specialty Notifications Start Suyapa Arriola MD PCP - General Internal Medicine  6/9/22     Phone: [phone] Pager: Use Vocera Fax: [phone] [address]  [facility] [address]     KATE Lucas MD MD Urology  9/8/21     Pager: [phone]         Suyapa Sinha MD Assigned PCP   5/18/22     Phone: [phone] Pager: Use Vocera Fax: [phone] [address]  [facility] [address]    Felicity Amezcua AuD Audiologist Audiology  6/13/23     Phone: [phone] Fax: [phone]         [address] [facility] [address]    Erma Harris, [facility] Lead Care Coordinator  Admissions 1/9/25     Phone: [phone]                     My Care Plans  Self Management and Treatment Plan    Care Plan  Care Plan: Community Resources       Problem: Medical disability       Note:     Goal Statement: Timmy will continue working with Care Coordination to ensure his needs are met for her overall health and wellbeing.  Date Goal Set: 1/21/25  Barriers: Learning about SSDI process  Strengths: Strong support system  Date to Achieve By: 1/21/26  Patient expressed understanding of goal: yes  Action steps to achieve this goal:  1. I will continue to follow up with medical team as needed  2. I will review information SW is sending me about [employer]   3. I will outreach to Care Coordination  for further questions or concerns          Goal: Establish adequate home support                             Action Plans on File:                       Advance Care Plans/Directives:             My Medical and Care Information  Problem List   Patient Active Problem List   Diagnosis    Glioma (H)    Neoplasm of brain (H)    Family history of malignant neoplasm of prostate    Gastroesophageal reflux disease with esophagitis without hemorrhage    Non-celiac gluten sensitivity    Drug-induced erectile dysfunction    Chronic fatigue    Vestibular neuronitis, unspecified laterality    Tear of right acetabular labrum    Family history of coronary arteriosclerosis    Benign localized prostatic hyperplasia with lower urinary tract symptoms (LUTS)    Family history of celiac disease    Numbness and  tingling of left arm and leg    Hyperlipidemia LDL goal <100      Current Medications:  Please refer to the most recent medication list provided to you by your medical team and reach out to your provider with any questions or to make any corrections.    Care Coordination Start Date: 1/7/2025   Frequency of Care Coordination: monthly, more frequently as needed     Form Last Updated: 01/21/2025

## 2025-01-21 NOTE — LETTER
[facility] CARE COORDINATION  [address]   [facility] [address]    January 21, 2025    Scott Ocampo  [address]   [address]  [place] [address]      Dear Timmy,    I am a clinic care coordinator who works with Suyapa Sinha MD with the [facility]. I wanted to introduce myself and provide you with my contact information for you to be able to call me with any questions or concerns. Below is a description of clinic care coordination and how I can further assist you.       The clinic care coordination team is made up of a registered nurse, , financial resource worker and community health worker who understand the health care system. The goal of clinic care coordination is to help you manage your health and improve access to the health care system. Our team works alongside your provider to assist you in determining your health and social needs. We can help you obtain health care and community resources, providing you with necessary information and education. We can work with you through any barriers and develop a care plan that helps coordinate and strengthen the communication between you and your care team.  Our services are voluntary and are offered without charge to you personally.    Please feel free to contact me with any questions or concerns regarding care coordination and what we can offer.      We are focused on providing you with the highest-quality healthcare experience possible.    Sincerely,     Erma Harris,  [facility]  Clinic Care Coordinator  [facility]  [phone]  [email or web address]      Enclosed: Cancer [employer]    Jennyfer Siddiqui joined [employer] in September 2022. At [facility], she assists clients in navigating the Social Security Administration's disability programs.  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 While at [facility], she focused her work on addressing legal issues that presented barriers to her client's health and well-being.  When not advocating for her clients, Kayode can be found keeping up with her amazing kids (ages 5 and 9), singing along to [place] musicals, tending to her family s garden, puzzling, and cheering on the [place] Lynx.  Kayode can be reached at [phone] extension [phone] or [email or web address].    Here is the website for [employer]: [email or web address]

## 2025-01-21 NOTE — PROGRESS NOTES
Clinic Care Coordination Contact  Clinic Care Coordination Contact  OUTREACH    Referral Information:  Referral Source: PCP  Primary Diagnosis: Psychosocial    SW called and spoke with pt at length.  Pt shares that he can no longer work due to the type of work he used to do.  It was very high stress and involved a lot of travel, neither of which pt can do any longer.  Pt shares that he feels like he is functioning okay cognitively, but not as well as he used to.  He shares that maintaining his health seems to be a full time job lately and he has to be very mindful of his limitations.  He shares that he has two daughters who are local, and one of them is an NP.  Pt shares that his mother is also alive, but she herself requires caregiving.  Pt shares that he is doing fine in terms of his mental health, as he has made it a point to have activities that provide a service to others. 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  Pt shares that this gives him a sense of purpose.  Pt shares that he is considering moving, as his rent is high.  He shares that there is a family cabin in [place] he is considering, as he won't have a rent payment there.  He does not like the thought of switching PCP's, as he is very happy with current PCP.  Pt shares that he has to do frequent blood draws, and also sees oncology at [facility].  SW shares that there are companies that will come to the home to do labwork, and this might be something for him to consider.  Or that he sees Dr Ernestine arango.  Pt or SW would have to check into whether he could have an [place] PCP while living in [facility].  He is going to look into this further, as well as new PCP.  Pt shares that he has no SDOH concerns, he is looking for information about SSDI primarily.    SW is sending pt information about [employer], with a referral for someone recommended by Oncology SW.    SW is sending  information via email per pt request, as he sometimes has difficulty finding files though [employer].     Chief Complaint   Patient presents with    Clinic Care Coordination - Initial        Universal Utilization:      Utilization      No Show Count (past year)  1             ED Visits  0             Hospital Admissions  1                    Current as of: 1/21/2025 11:15 AM                Clinical Concerns:  Current Medical Concerns:  Psychosocial     Current Behavioral Concerns: N/A    Education Provided to patient: CCC, [employer], SSDI, mobile lab services      Health Maintenance Reviewed: Due/Overdue   Health Maintenance Due   Topic Date Due    Pneumococcal Vaccine: 50+ Years (1 of 2 - PCV) Never done    ZOSTER IMMUNIZATION (1 of 2) Never done    COVID-19 Vaccine (5 - 2024-25 season) 09/01/2024       Clinical Pathway: None    Medication Management:  Medication review status:        Functional Status:       Living Situation:  Current living arrangement:: I live alone  Type of residence:: Private home - no stairs    Lifestyle & Psychosocial Needs:    Social Drivers of Health     Food Insecurity: Low Risk  (1/7/2025)    Food Insecurity     Within the past 12 months, did you worry that your food would run out before you got money to buy more?: No     Within the past 12 months, did the food you bought just not last and you didn t have money to get more?: No   Depression: Not at risk (1/7/2025)    PHQ-2     PHQ-2 Score: 2   Housing Stability: Low Risk  (1/7/2025)    Housing Stability     Do you have housing? : Yes     Are you worried about losing your housing?: No   Tobacco Use: Low Risk  (1/7/2025)    Patient History     Smoking Tobacco Use: Never     Smokeless Tobacco Use: Never     Passive Exposure: Not on file   Financial Resource Strain: Low Risk  (1/7/2025)    Financial Resource Strain     Within the past 12 months, have you or your family members you live with been unable to get utilities (heat,  electricity) when it was really needed?: No   Alcohol Use: Not At Risk (4/18/2023)    Received from [facility]    AUDIT-C   Transportation Needs: Low Risk  (1/7/2025)    Transportation Needs     Within the past 12 months, has lack of transportation kept you from medical appointments, getting your medicines, non-medical meetings or appointments, work, or from getting things that you need?: No   Physical Activity: Unknown (1/7/2025)    Exercise Vital Sign     Days of Exercise per Week: 3 days     Minutes of Exercise per Session: Not on file   Interpersonal Safety: Low Risk  (1/2/2024)    Interpersonal Safety     Do you feel physically and emotionally safe where you currently live?: Yes     Within the past 12 months, have you been hit, slapped, kicked or otherwise physically hurt by someone?: No     Within the past 12 months, have you been humiliated or emotionally abused in other ways by your partner or ex-partner?: No   Stress: No Stress Concern Present (1/7/2025)    [employer] [place] [employer] [employer] - Occupational Stress Questionnaire     Feeling of Stress : Only a little   Social Connections: Unknown (1/7/2025)    Social Connection and Isolation Panel [NHANES]     Frequency of Communication with Friends and Family: Not on file     Frequency of Social Gatherings with Friends and Family: Once a week     Attends Hoahaoism Services: Not on file     Active Member of Clubs or Organizations: Not on file     Attends Club or Organization Meetings: Not on file     Marital Status: Not on file   Health Literacy: Not on file        Transportation means:: Family, Friend, Regular car     Informal Support system:: Children, Family          Resources and Interventions:  Current Resources:      Community Resources: None  Supplies Currently Used at Home: None  Equipment Currently Used at Home: none  Employment Status: disabled, other (see comments) (Medical)              Referrals Placed: Community Resources         Care  Plan:  Care Plan: Community Resources       Problem: Medical disability       Note:     Goal Statement: Timmy will continue working with Care Coordination to ensure his needs are met for her overall health and wellbeing.  Date Goal Set: 1/21/25  Barriers: Learning about SSDI process  Strengths: Strong support system  Date to Achieve By: 1/21/26  Patient expressed understanding of goal: yes  Action steps to achieve this goal:  1. I will continue to follow up with medical team as needed  2. I will review information SW is sending me about Cancer [employer] Care   3. I will outreach to Care Coordination  for further questions or concerns          Goal: Establish adequate home support                             Patient/Caregiver understanding: yes    Outreach Frequency: monthly, more frequently as needed  Future Appointments                In 11 months Suyapa Sinha MD [facility] ZECHARIAH Bundy            Plan: SW to follow.     Erma Harris,  [facility]  Clinic Care Coordinator  [facility]  [phone]  [email or web address]

## 2025-01-22 ENCOUNTER — TELEPHONE (OUTPATIENT)
Dept: FAMILY MEDICINE | Facility: CLINIC | Age: 60
End: 2025-01-22
Payer: COMMERCIAL

## 2025-01-22 DIAGNOSIS — C71.9 GLIOMA (H): Primary | ICD-10-CM

## 2025-01-22 NOTE — TELEPHONE ENCOUNTER
Please fax standing lab orders for CMP and CBC with diff. Every 2 weeks x 6 months (patient doesn't know how long his treatment with vorasidenib will last. Orders pended for review.     [facility], [place]    Fax: [phone]    Please vorasidenib    Can we leave a detailed message on this number? YES  Phone number patient can be reached at: Cell number on file:    Telephone Information:   Mobile [phone]       Silvina Farnsworth RN  [facility] Triage

## 2025-01-23 NOTE — TELEPHONE ENCOUNTER
Dr. Sinha, please advise.    Triage spoke to pt and relayed provider message below - pt verbalized understanding, and reported that his [facility] care team is requesting lab followups q4wk vs. Q2wk as the standing orders are written.    Given that the orders are already written, pt said he's okay with cancelling every other lab draw, but wanted to check with you if you wanted to make changes. Thoughts?    Triage also reviewed communication options for results being reported back to [facility] more directly, like providing pt's clinic with [facility] fax# to send results. Pt verbalized understanding, and would reachout if he has trouble.    Ursula Blue RN

## 2025-01-23 NOTE — TELEPHONE ENCOUNTER
Suyapa Sinha MD Erickson, Kimberly R, RN; P [facility]  Caller: Unspecified (Yesterday,  2:59 PM)  Signed, but mostly order comes from [facility], so report goes to them

## 2025-01-27 ENCOUNTER — MYC MEDICAL ADVICE (OUTPATIENT)
Dept: FAMILY MEDICINE | Facility: CLINIC | Age: 60
End: 2025-01-27

## 2025-02-19 ENCOUNTER — TRANSFERRED RECORDS (OUTPATIENT)
Dept: HEALTH INFORMATION MANAGEMENT | Facility: CLINIC | Age: 60
End: 2025-02-19
Payer: COMMERCIAL

## 2025-02-19 LAB
ALT SERPL-CCNC: 54 U/L (ref 13–69)
AST SERPL-CCNC: 45 U/L (ref 15–46)
CREATININE (EXTERNAL): 0.9 MG/DL (ref 0.66–1.25)
GFR ESTIMATED (EXTERNAL): >60 ML/MIN/1.73M*2
GLUCOSE (EXTERNAL): 105 MG/DL (ref 70–100)
POTASSIUM (EXTERNAL): 3.7 MMOL/L (ref 3.5–5.1)

## 2025-02-21 ENCOUNTER — TRANSFERRED RECORDS (OUTPATIENT)
Dept: HEALTH INFORMATION MANAGEMENT | Facility: CLINIC | Age: 60
End: 2025-02-21
Payer: COMMERCIAL

## 2025-04-29 ENCOUNTER — PATIENT OUTREACH (OUTPATIENT)
Dept: CARE COORDINATION | Facility: CLINIC | Age: 60
End: 2025-04-29
Payer: COMMERCIAL

## 2025-04-29 NOTE — PROGRESS NOTES
Clinic Care Coordination Contact  Clinic SW Care Coordination Contact  OUTREACH    Referral Information:   SW attempted monthly follow up call. Pt. was just walking into an appt and requested a call back next week.        Plan: Pt will call the Clinic with any needs.  SW CC will reach out again n 1 week,      CHALINO Buckley / for Erma aHrris. [facility]   Care Coordination  [facility]  4/29/2025 10:20 AM

## 2025-05-21 DIAGNOSIS — C71.9 BRAIN NEOPLASM MALIGNANT (H): Primary | ICD-10-CM

## 2025-05-21 DIAGNOSIS — Z79.899 POLYPHARMACY: ICD-10-CM

## 2025-07-02 ENCOUNTER — PATIENT OUTREACH (OUTPATIENT)
Dept: CARE COORDINATION | Facility: CLINIC | Age: 60
End: 2025-07-02
Payer: COMMERCIAL

## 2025-07-16 ENCOUNTER — PATIENT OUTREACH (OUTPATIENT)
Dept: CARE COORDINATION | Facility: CLINIC | Age: 60
End: 2025-07-16
Payer: COMMERCIAL

## 2025-07-16 NOTE — PROGRESS NOTES
Clinic Care Coordination Contact  [facility]/Voicemail    Clinical Data: Care Coordinator Outreach    Outreach Documentation Number of Outreach Attempt   7/2/2025  12:42 PM 1   7/16/2025   2:13 PM 2       Left message on patient's voicemail with call back information and requested return call.      Plan: Care Coordinator will do no further outreaches at this time.    Erma Harris,  [facility]  Clinic Care Coordinator  [facility]  [phone]  [email or web address]

## 2025-08-12 ENCOUNTER — LAB (OUTPATIENT)
Dept: LAB | Facility: CLINIC | Age: 60
End: 2025-08-12
Payer: COMMERCIAL

## 2025-08-12 DIAGNOSIS — C71.9 BRAIN NEOPLASM MALIGNANT (H): ICD-10-CM

## 2025-08-12 DIAGNOSIS — Z79.899 POLYPHARMACY: ICD-10-CM

## 2025-08-12 LAB
BASOPHILS # BLD AUTO: 0.04 10E3/UL (ref 0–0.2)
BASOPHILS NFR BLD AUTO: 0.8 %
EOSINOPHIL # BLD AUTO: 0.25 10E3/UL (ref 0–0.7)
EOSINOPHIL NFR BLD AUTO: 4.7 %
ERYTHROCYTE [DISTWIDTH] IN BLOOD BY AUTOMATED COUNT: 11.9 % (ref 10–15)
HCT VFR BLD AUTO: 46.6 % (ref 40–53)
HGB BLD-MCNC: 15.8 G/DL (ref 13.3–17.7)
IMM GRANULOCYTES # BLD: 0.01 10E3/UL
IMM GRANULOCYTES NFR BLD: 0.2 %
LYMPHOCYTES # BLD AUTO: 1.17 10E3/UL (ref 0.8–5.3)
LYMPHOCYTES NFR BLD AUTO: 22 %
MCH RBC QN AUTO: 32.2 PG (ref 26.5–33)
MCHC RBC AUTO-ENTMCNC: 33.9 G/DL (ref 31.5–36.5)
MCV RBC AUTO: 94.9 FL (ref 78–100)
MONOCYTES # BLD AUTO: 0.5 10E3/UL (ref 0–1.3)
MONOCYTES NFR BLD AUTO: 9.4 %
NEUTROPHILS # BLD AUTO: 3.34 10E3/UL (ref 1.6–8.3)
NEUTROPHILS NFR BLD AUTO: 62.9 %
PLATELET # BLD AUTO: 194 10E3/UL (ref 150–450)
RBC # BLD AUTO: 4.91 10E6/UL (ref 4.4–5.9)
WBC # BLD AUTO: 5.31 10E3/UL (ref 4–11)

## 2025-08-12 PROCEDURE — 36415 COLL VENOUS BLD VENIPUNCTURE: CPT

## 2025-08-12 PROCEDURE — 85025 COMPLETE CBC W/AUTO DIFF WBC: CPT

## 2025-08-12 PROCEDURE — 80053 COMPREHEN METABOLIC PANEL: CPT

## 2025-08-13 LAB
ALBUMIN SERPL BCG-MCNC: 4.4 G/DL (ref 3.5–5.2)
ALP SERPL-CCNC: 64 U/L (ref 40–150)
ALT SERPL W P-5'-P-CCNC: 27 U/L (ref 0–70)
ANION GAP SERPL CALCULATED.3IONS-SCNC: 7 MMOL/L (ref 7–15)
AST SERPL W P-5'-P-CCNC: 24 U/L (ref 0–45)
BILIRUB SERPL-MCNC: 0.2 MG/DL
BUN SERPL-MCNC: 13.3 MG/DL (ref 8–23)
CALCIUM SERPL-MCNC: 10 MG/DL (ref 8.8–10.4)
CHLORIDE SERPL-SCNC: 104 MMOL/L (ref 98–107)
CREAT SERPL-MCNC: 0.95 MG/DL (ref 0.67–1.17)
EGFRCR SERPLBLD CKD-EPI 2021: >90 ML/MIN/1.73M2
GLUCOSE SERPL-MCNC: 89 MG/DL (ref 70–99)
HCO3 SERPL-SCNC: 31 MMOL/L (ref 22–29)
POTASSIUM SERPL-SCNC: 5.1 MMOL/L (ref 3.4–5.3)
PROT SERPL-MCNC: 6.6 G/DL (ref 6.4–8.3)
SODIUM SERPL-SCNC: 142 MMOL/L (ref 135–145)

## (undated) DEVICE — ENDO TROCAR BALLOON TIP 10MM  OMS-T10SB

## (undated) DEVICE — PREP CHLORAPREP 26ML TINTED ORANGE  260815

## (undated) DEVICE — SOL NACL 0.9% IRRIG 1000ML BOTTLE 07138-09

## (undated) DEVICE — GLOVE PROTEXIS W/NEU-THERA 8.0  2D73TE80

## (undated) DEVICE — CATH TRAY FOLEY COUDE SURESTEP 16FR W/URNE MTR STLK A304716A

## (undated) DEVICE — SU VICRYL 4-0 PS-2 18" UND J496H

## (undated) DEVICE — SYR 10ML LL W/O NDL

## (undated) DEVICE — GOWN IMPERVIOUS SPECIALTY XLG/XLONG 32474

## (undated) DEVICE — PACK LAP CHOLE SLC15LCFSD

## (undated) DEVICE — SU VICRYL 0 UR-6 27" J603H

## (undated) DEVICE — ESU PENCIL W/HOLSTER E2350H

## (undated) DEVICE — BLADE CLIPPER 4406

## (undated) DEVICE — SU VICRYL 3-0 SH 27" J316H

## (undated) DEVICE — LINEN TOWEL PACK X5 5464

## (undated) DEVICE — SU VICRYL 2-0 TIE 12X18" J905T

## (undated) DEVICE — ENDO TROCAR DISSECTING BALLOON OMS-PDBS2

## (undated) DEVICE — ESU ELEC BLADE 2.75" COATED/INSULATED E1455

## (undated) DEVICE — DECANTER VIAL 2006S

## (undated) DEVICE — DRAPE LAP W/ARMBOARD 29410

## (undated) DEVICE — ENDO TROCAR 05.5MM PEDI 24055

## (undated) RX ORDER — PROPOFOL 10 MG/ML
INJECTION, EMULSION INTRAVENOUS
Status: DISPENSED
Start: 2018-05-15

## (undated) RX ORDER — ROPIVACAINE HYDROCHLORIDE 5 MG/ML
INJECTION, SOLUTION EPIDURAL; INFILTRATION; PERINEURAL
Status: DISPENSED
Start: 2024-12-19

## (undated) RX ORDER — DEXAMETHASONE SODIUM PHOSPHATE 4 MG/ML
INJECTION, SOLUTION INTRA-ARTICULAR; INTRALESIONAL; INTRAMUSCULAR; INTRAVENOUS; SOFT TISSUE
Status: DISPENSED
Start: 2018-05-15

## (undated) RX ORDER — GLYCOPYRROLATE 0.2 MG/ML
INJECTION, SOLUTION INTRAMUSCULAR; INTRAVENOUS
Status: DISPENSED
Start: 2018-05-15

## (undated) RX ORDER — FENTANYL CITRATE 50 UG/ML
INJECTION, SOLUTION INTRAMUSCULAR; INTRAVENOUS
Status: DISPENSED
Start: 2018-05-15

## (undated) RX ORDER — CEFAZOLIN SODIUM 2 G/100ML
INJECTION, SOLUTION INTRAVENOUS
Status: DISPENSED
Start: 2018-05-15

## (undated) RX ORDER — FENTANYL CITRATE 50 UG/ML
INJECTION, SOLUTION INTRAMUSCULAR; INTRAVENOUS
Status: DISPENSED
Start: 2024-04-25

## (undated) RX ORDER — OXYCODONE HYDROCHLORIDE 5 MG/1
TABLET ORAL
Status: DISPENSED
Start: 2018-05-15

## (undated) RX ORDER — NEOSTIGMINE METHYLSULFATE 1 MG/ML
VIAL (ML) INJECTION
Status: DISPENSED
Start: 2018-05-15

## (undated) RX ORDER — EPINEPHRINE 1 MG/ML
INJECTION, SOLUTION, CONCENTRATE INTRAVENOUS
Status: DISPENSED
Start: 2024-12-19

## (undated) RX ORDER — LIDOCAINE HYDROCHLORIDE 10 MG/ML
INJECTION, SOLUTION EPIDURAL; INFILTRATION; INTRACAUDAL; PERINEURAL
Status: DISPENSED
Start: 2024-12-19

## (undated) RX ORDER — ONDANSETRON 2 MG/ML
INJECTION INTRAMUSCULAR; INTRAVENOUS
Status: DISPENSED
Start: 2018-05-15

## (undated) RX ORDER — LIDOCAINE HYDROCHLORIDE 20 MG/ML
JELLY TOPICAL
Status: DISPENSED
Start: 2024-12-19

## (undated) RX ORDER — KETOROLAC TROMETHAMINE 30 MG/ML
INJECTION, SOLUTION INTRAMUSCULAR; INTRAVENOUS
Status: DISPENSED
Start: 2018-05-15

## (undated) RX ORDER — LIDOCAINE HYDROCHLORIDE 20 MG/ML
INJECTION, SOLUTION EPIDURAL; INFILTRATION; INTRACAUDAL; PERINEURAL
Status: DISPENSED
Start: 2018-05-15